# Patient Record
Sex: MALE | Race: OTHER | Employment: FULL TIME | URBAN - METROPOLITAN AREA
[De-identification: names, ages, dates, MRNs, and addresses within clinical notes are randomized per-mention and may not be internally consistent; named-entity substitution may affect disease eponyms.]

---

## 2017-03-28 ENCOUNTER — OFFICE VISIT (OUTPATIENT)
Dept: FAMILY MEDICINE CLINIC | Age: 32
End: 2017-03-28

## 2017-03-28 ENCOUNTER — HOSPITAL ENCOUNTER (OUTPATIENT)
Dept: LAB | Age: 32
Discharge: HOME | End: 2017-03-28
Payer: COMMERCIAL

## 2017-03-28 VITALS
HEIGHT: 70 IN | BODY MASS INDEX: 32.35 KG/M2 | OXYGEN SATURATION: 98 % | TEMPERATURE: 98.3 F | WEIGHT: 226 LBS | SYSTOLIC BLOOD PRESSURE: 159 MMHG | HEART RATE: 78 BPM | DIASTOLIC BLOOD PRESSURE: 95 MMHG

## 2017-03-28 DIAGNOSIS — Z86.14 HISTORY OF MRSA INFECTION: ICD-10-CM

## 2017-03-28 DIAGNOSIS — I51.9 CARDIAC DISEASE: ICD-10-CM

## 2017-03-28 DIAGNOSIS — G89.29 CHRONIC MIDLINE LOW BACK PAIN WITH LEFT-SIDED SCIATICA: ICD-10-CM

## 2017-03-28 DIAGNOSIS — R01.1 HEART MURMUR: ICD-10-CM

## 2017-03-28 DIAGNOSIS — Z76.89 ENCOUNTER TO ESTABLISH CARE: ICD-10-CM

## 2017-03-28 DIAGNOSIS — M54.42 CHRONIC MIDLINE LOW BACK PAIN WITH LEFT-SIDED SCIATICA: ICD-10-CM

## 2017-03-28 DIAGNOSIS — F41.9 ANXIETY: ICD-10-CM

## 2017-03-28 LAB
ALBUMIN SERPL BCP-MCNC: 4.3 G/DL (ref 3.4–5)
ALBUMIN/GLOB SERPL: 1.2 {RATIO} (ref 0.8–1.7)
ALP SERPL-CCNC: 88 U/L (ref 45–117)
ALT SERPL-CCNC: 43 U/L (ref 16–61)
ANION GAP BLD CALC-SCNC: 10 MMOL/L (ref 3–18)
AST SERPL W P-5'-P-CCNC: 31 U/L (ref 15–37)
BASOPHILS # BLD AUTO: 0 K/UL (ref 0–0.06)
BASOPHILS # BLD: 0 % (ref 0–2)
BILIRUB SERPL-MCNC: 0.5 MG/DL (ref 0.2–1)
BUN SERPL-MCNC: 12 MG/DL (ref 7–18)
BUN/CREAT SERPL: 11 (ref 12–20)
CALCIUM SERPL-MCNC: 9.7 MG/DL (ref 8.5–10.1)
CHLORIDE SERPL-SCNC: 103 MMOL/L (ref 100–108)
CHOLEST SERPL-MCNC: 242 MG/DL
CO2 SERPL-SCNC: 27 MMOL/L (ref 21–32)
CREAT SERPL-MCNC: 1.14 MG/DL (ref 0.6–1.3)
DIFFERENTIAL METHOD BLD: ABNORMAL
EOSINOPHIL # BLD: 0.1 K/UL (ref 0–0.4)
EOSINOPHIL NFR BLD: 1 % (ref 0–5)
ERYTHROCYTE [DISTWIDTH] IN BLOOD BY AUTOMATED COUNT: 14.2 % (ref 11.6–14.5)
EST. AVERAGE GLUCOSE BLD GHB EST-MCNC: 105 MG/DL
GLOBULIN SER CALC-MCNC: 3.5 G/DL (ref 2–4)
GLUCOSE SERPL-MCNC: 88 MG/DL (ref 74–99)
HBA1C MFR BLD: 5.3 % (ref 4.2–5.6)
HCT VFR BLD AUTO: 49.2 % (ref 36–48)
HDLC SERPL-MCNC: 44 MG/DL (ref 40–60)
HDLC SERPL: 5.5 {RATIO} (ref 0–5)
HGB BLD-MCNC: 16.2 G/DL (ref 13–16)
LDLC SERPL CALC-MCNC: 149.8 MG/DL (ref 0–100)
LIPID PROFILE,FLP: ABNORMAL
LYMPHOCYTES # BLD AUTO: 23 % (ref 21–52)
LYMPHOCYTES # BLD: 1.3 K/UL (ref 0.9–3.6)
MCH RBC QN AUTO: 30.2 PG (ref 24–34)
MCHC RBC AUTO-ENTMCNC: 32.9 G/DL (ref 31–37)
MCV RBC AUTO: 91.6 FL (ref 74–97)
MONOCYTES # BLD: 0.7 K/UL (ref 0.05–1.2)
MONOCYTES NFR BLD AUTO: 11 % (ref 3–10)
NEUTS SEG # BLD: 3.7 K/UL (ref 1.8–8)
NEUTS SEG NFR BLD AUTO: 65 % (ref 40–73)
PLATELET # BLD AUTO: 209 K/UL (ref 135–420)
PMV BLD AUTO: 10.5 FL (ref 9.2–11.8)
POTASSIUM SERPL-SCNC: 4.4 MMOL/L (ref 3.5–5.5)
PROT SERPL-MCNC: 7.8 G/DL (ref 6.4–8.2)
RBC # BLD AUTO: 5.37 M/UL (ref 4.7–5.5)
SODIUM SERPL-SCNC: 140 MMOL/L (ref 136–145)
TRIGL SERPL-MCNC: 241 MG/DL (ref ?–150)
TSH SERPL DL<=0.05 MIU/L-ACNC: 2.9 UIU/ML (ref 0.36–3.74)
VLDLC SERPL CALC-MCNC: 48.2 MG/DL
WBC # BLD AUTO: 5.8 K/UL (ref 4.6–13.2)

## 2017-03-28 PROCEDURE — 80053 COMPREHEN METABOLIC PANEL: CPT | Performed by: NURSE PRACTITIONER

## 2017-03-28 PROCEDURE — 85025 COMPLETE CBC W/AUTO DIFF WBC: CPT | Performed by: NURSE PRACTITIONER

## 2017-03-28 PROCEDURE — 87389 HIV-1 AG W/HIV-1&-2 AB AG IA: CPT | Performed by: NURSE PRACTITIONER

## 2017-03-28 PROCEDURE — 80061 LIPID PANEL: CPT | Performed by: NURSE PRACTITIONER

## 2017-03-28 PROCEDURE — 36415 COLL VENOUS BLD VENIPUNCTURE: CPT | Performed by: NURSE PRACTITIONER

## 2017-03-28 PROCEDURE — 84443 ASSAY THYROID STIM HORMONE: CPT | Performed by: NURSE PRACTITIONER

## 2017-03-28 PROCEDURE — 83036 HEMOGLOBIN GLYCOSYLATED A1C: CPT | Performed by: NURSE PRACTITIONER

## 2017-03-28 RX ORDER — ESCITALOPRAM OXALATE 10 MG/1
10 TABLET ORAL DAILY
Qty: 30 TAB | Refills: 0 | Status: SHIPPED | OUTPATIENT
Start: 2017-03-28 | End: 2019-12-26 | Stop reason: SDUPTHER

## 2017-03-28 RX ORDER — DOXYCYCLINE 100 MG/1
100 CAPSULE ORAL 2 TIMES DAILY
COMMUNITY
End: 2019-12-26

## 2017-03-28 NOTE — PROGRESS NOTES
HPI  Bailey Haley is a 36 y.o. male  Chief Complaint   Patient presents with   [facility] Other     Pt states that he may have possible MRSA. Pt has HX of MRSA. Pt states that he is having some nose irritation. Pt states that symptoms started last week. Pt denies havinf an Infectious Disease doctor. Pt did speak with [facility] through his insurance. Pt states that e was instructed to take the doxycycline and the bactrim ointment. Pt states the swelling and redness has come subsided since then. Reports he was in the hospital last year for MRSA in his elbow. Reports he also had MRSA on his finger last year but was not hospitalized. Reports he had it in his finger and nose the year before and he was hospitalized for this. Reports hospitalization for MRSA in 2015 and 2016. Diagnosed four different times over the last two years with only two of those times resulting in hospitalization. Reports last night he noticed a pimple in his nose. Reports last night his nose was red and tender on the tip of his nose. Reports he called [facility] and was told to take the doxycyline that he had left over from last year along with a cold compress. Reports this did help. Reports he did take a doxycycline last night but did not take one today as of yet. Denies fevers but reports chills this morning. Reports pimple is gone but he thinks he has MRSA. Reports congestion from allergies. Has 7-8 days of the doxycycline left. He states that he is also using Bactroban to his nares. Reports back pain has returned and his anxiety has increased which he states that both contribute to his blood pressure being elevated. Reports seeing Dr. Edilson Garcia from orthopedics for his back pain but has not been back to see in her six months. Reports radiation of pain down to his left leg with a prior diagnosis of sciatica. Reports increased stress at work and a six month baby at home. Reports he is feeling anxious.  Denies desire to hurt or harm himself or others. Denies suicidal ideations. Reports a cardiac history - Congenital heart disease and open heart surgery in 1987. 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  mupirocin calcium (BACTROBAN) 2 % nasal ointment by Both Nostrils route two (2) times a day.  escitalopram oxalate (LEXAPRO) 10 mg tablet Take 1 Tab by mouth daily.  30 Tab 0    oxyCODONE-acetaminophen (PERCOCET) 5-325 mg per tablet TAKE 1 TABLET BY MOUTH EVERY SIX HOURS AS NEEDED FOR PAIN  0    topiramate (TOPAMAX) 25 mg tablet Take 1 in the evening for 1 week, then increase to 2 the second week and continue with 3 in the evening 90 Tab 1       Allergies  Allergies   Allergen Reactions    Ceclor [Cefaclor] Hives    Clindamycin Hives    Sulfa (Sulfonamide Antibiotics) Hives       Family History  Family History   Problem Relation Age of Onset    Diabetes Maternal Grandmother        Social History  Social History     Social History    Marital status:      Spouse name: N/A    Number of children: N/A    Years of education: N/A     Occupational History          Social History Main Topics    Smoking status: Never Smoker    Smokeless tobacco: Never Used    Alcohol use No    Drug use: No    Sexual activity: Not on file     Other Topics Concern    Not on file     Social History Narrative       Problem List  Patient Active Problem List   Diagnosis Code    Neuritis of left lower extremity G57.92    HNP (herniated nucleus pulposus), lumbar M51.26       Review of Systems  Review of Systems   Constitutional: Positive for chills. Negative for diaphoresis and fever. HENT: Positive for congestion. Negative for nosebleeds and sore throat. Respiratory: Negative for cough and shortness of breath. Cardiovascular: Negative for chest pain and palpitations. Gastrointestinal: Negative for abdominal pain, diarrhea, nausea and vomiting. Musculoskeletal: Positive for back pain. Neurological: Negative for dizziness and tingling. Psychiatric/Behavioral: Positive for depression. Negative for suicidal ideas. The patient is nervous/anxious. Vital Signs  Vitals:    03/28/17 1024   BP: (!) 159/95   Pulse: 78   Temp: 98.3 °F (36.8 °C)   TempSrc: Oral   SpO2: 98%   Weight: 226 lb (102.5 kg)   Height: 5' 10\" (1.778 m)   PainSc:   0 - No pain     PHQ 2 / 9, over the last two weeks 3/28/2017   Little interest or pleasure in doing things Not at all   Feeling down, depressed or hopeless Nearly every day   Total Score PHQ 2 3   Trouble falling or staying asleep, or sleeping too much More than half the days   Feeling tired or having little energy Nearly every day   Poor appetite or overeating Nearly every day   Feeling bad about yourself - or that you are a failure or have let yourself or your family down Several days   Trouble concentrating on things such as school, work, reading or watching TV Nearly every day   Moving or speaking so slowly that other people could have noticed; or the opposite being so fidgety that others notice Several days   Thoughts of being better off dead, or hurting yourself in some way Not at all   PHQ 9 Score 16   How difficult have these problems made it for you to do your work, take care of your home and get along with others Somewhat difficult     MISAEL Score 13 - somewhat difficult    Physical Exam  Physical Exam   Constitutional: He is oriented to person, place, and time.    HENT:   Nose: Mucosal edema, rhinorrhea and sinus tenderness present. Right sinus exhibits no maxillary sinus tenderness and no frontal sinus tenderness. Left sinus exhibits no maxillary sinus tenderness and no frontal sinus tenderness. Left nare red. No pimple, bruising, abrasion, or laceration noted. Tip of nose tender to touch with no redness or bruising noted. Cardiovascular: Normal rate. Murmur heard. Pulmonary/Chest: Effort normal and breath sounds normal.   Abdominal: Soft. Bowel sounds are normal. He exhibits no distension. Musculoskeletal: Normal range of motion. Neurological: He is alert and oriented to person, place, and time. Skin: Skin is warm and dry. Psychiatric: His speech is normal. His mood appears anxious.        Diagnostics  Orders Placed This Encounter    HIV 1/2 AG/AB, 4TH GENERATION,W RFLX CONFIRM     Standing Status:   Future     Number of Occurrences:   1     Standing Expiration Date:   3/29/2018    CBC WITH AUTOMATED DIFF     Standing Status:   Future     Number of Occurrences:   1     Standing Expiration Date:   8/81/4940    METABOLIC PANEL, COMPREHENSIVE     Standing Status:   Future     Number of Occurrences:   1     Standing Expiration Date:   9/25/2017    LIPID PANEL     Standing Status:   Future     Number of Occurrences:   1     Standing Expiration Date:   9/25/2017    TSH 3RD GENERATION     Standing Status:   Future     Number of Occurrences:   1     Standing Expiration Date:   9/25/2017    HEMOGLOBIN A1C WITH EAG     Standing Status:   Future     Number of Occurrences:   1     Standing Expiration Date:   3/29/2018    REFERRAL TO INFECTIOUS DISEASE     Referral Priority:   Routine     Referral Type:   Consultation     Referral Reason:   Specialty Services Required     Referred to Provider:   Trina Rizzo MD     Requested Specialty:   Infectious Diseases    REFERRAL TO CARDIOLOGY     Referral Priority:   Routine     Referral Type:   Consultation     Referral Reason:   Specialty Services Required Referred to Provider:   Rusty Shaikh MD     Requested Specialty:   Cardiology    doxycycline (VIBRAMYCIN) 100 mg capsule     Sig: Take 100 mg by mouth two (2) times a day.  mupirocin calcium (BACTROBAN) 2 % nasal ointment     Sig: by Both Nostrils route two (2) times a day.  escitalopram oxalate (LEXAPRO) 10 mg tablet     Sig: Take 1 Tab by mouth daily. Dispense:  30 Tab     Refill:  0       Results  No results found for this or any previous visit. Assessment and Plan  Josephine Dangelo was seen today for establish care and other. Diagnoses and all orders for this visit:    Elevated blood pressure    History of MRSA infection  -     REFERRAL TO INFECTIOUS DISEASE  -     CBC WITH AUTOMATED DIFF; Future    Chronic midline low back pain with left-sided sciatica    Anxiety  -     escitalopram oxalate (LEXAPRO) 10 mg tablet; Take 1 Tab by mouth daily. Encounter to establish care  -     HIV 1/2 AG/AB, 4TH GENERATION,W RFLX CONFIRM; Future  -     CBC WITH AUTOMATED DIFF; Future  -     METABOLIC PANEL, COMPREHENSIVE; Future  -     LIPID PANEL; Future  -     TSH 3RD GENERATION; Future  -     HEMOGLOBIN A1C WITH EAG; Future    Heart murmur  -     REFERRAL TO CARDIOLOGY    Cardiac disease  -     REFERRAL TO CARDIOLOGY    Other orders  -     Cancel: REFERRAL TO ORTHOPEDICS    Patient to continue doxycycline and Bactroban as prescribed. Educated on side effects of medication and possible allergic reactions discussed. Patient verbalized understanding and states he had no reactions from the medication in the past. Instructed in good hand hygiene. Instructed to keep fingers out of nose and avoid touching face, hands, body and other objects if he does touch his nose. After care summary printed and reviewed with patient. Plan reviewed with patient. Questions answered. Patient verbalized understanding of plan and is in agreement with plan. Patient to follow up in two weeks or earlier if symptoms worsen.  Will re-evaluate medication, discuss lab results, and complete physical exam.    Aris Patel, KONG-C

## 2017-03-28 NOTE — MR AVS SNAPSHOT
Visit Information Date & Time Provider Department Dept. Phone Encounter #  
 3/28/2017 10:00 AM Maria Del Rosario Kaiser NP [address] [id number] Follow-up Instructions Return in about 2 weeks (around 4/11/2017), or if symptoms worsen or fail to improve. Upcoming Health Maintenance Date Due DTaP/Tdap/Td series (1 - Tdap) 11/19/2008 INFLUENZA AGE 9 TO ADULT 8/1/2016 Allergies as of 3/28/2017  Review Complete On: 3/28/2017 By: Maria Del Rosario Kaiser NP Severity Noted Reaction Type Reactions Ceclor [Cefaclor]  10/12/2016    Hives Clindamycin  09/26/2016    Hives Sulfa (Sulfonamide Antibiotics)  09/26/2016    Hives Current Immunizations  Never Reviewed No immunizations on file. Not reviewed this visit You Were Diagnosed With   
  
 Codes Comments Elevated blood pressure    -  Primary ICD-10-CM: I10 
ICD-9-CM: 401.9 History of MRSA infection     ICD-10-CM: Z86.14 
ICD-9-CM: V12.04 Chronic midline low back pain with left-sided sciatica     ICD-10-CM: M54.42, G89.29 ICD-9-CM: 724.2, 724.3, 338.29 Anxiety     ICD-10-CM: F41.9 ICD-9-CM: 300.00 Encounter to establish care     ICD-10-CM: Z76.89 
ICD-9-CM: V65.8 Heart murmur     ICD-10-CM: R01.1 ICD-9-CM: 091. 2 Cardiac disease     ICD-10-CM: I51.9 ICD-9-CM: 429.9 Vitals BP Pulse Temp Height(growth percentile) Weight(growth percentile) SpO2  
 (!) 159/95 (BP 1 Location: Right arm, BP Patient Position: Sitting) 78 98.3 °F (36.8 °C) (Oral) 5' 10\" (1.778 m) 226 lb (102.5 kg) 98% BMI Smoking Status 32.43 kg/m2 Never Smoker BMI and BSA Data Body Mass Index Body Surface Area  
 32.43 kg/m 2 2.25 m 2 Preferred Pharmacy Pharmacy Name Phone [id number] [address] [place] [address] Your Updated Medication List  
  
   
 This list is accurate as of: 3/28/17 11:29 AM.  Always use your most recent med list.  
  
  
  
  
 doxycycline 100 mg capsule Commonly known as:  VIBRAMYCIN Take 100 mg by mouth two (2) times a day. escitalopram oxalate 10 mg tablet Commonly known as:  Skip  Take 1 Tab by mouth daily. mupirocin calcium 2 % nasal ointment Commonly known as:  BACTROBAN  
by Both Nostrils route two (2) times a day. oxyCODONE-acetaminophen 5-325 mg per tablet Commonly known as:  PERCOCET TAKE 1 TABLET BY MOUTH EVERY SIX HOURS AS NEEDED FOR PAIN  
  
 topiramate 25 mg tablet Commonly known as:  TOPAMAX Take 1 in the evening for 1 week, then increase to 2 the second week and continue with 3 in the evening Prescriptions Sent to Pharmacy Refills  
 escitalopram oxalate (LEXAPRO) 10 mg tablet 0 Sig: Take 1 Tab by mouth daily. Class: Normal  
 Pharmacy: [facility]/pharmacy [address] [place] [address], [address] [place] [address] Ph #: [phone] Route: Oral  
  
We Performed the Following REFERRAL TO CARDIOLOGY [HPT23 Custom] Comments:  
 Please evaluate and treat patient for murmur history. Blood pressure recently elevated and patient has concerns with his history of congenital cardiac disease. Recently moved to the area in the last year. REFERRAL TO INFECTIOUS DISEASE [REF37 Custom] Comments:  
 Please evaluate and treat patient for recent history of MRSA infection. Patient was diagnosed and was being treated prior to moving to the area. Follow-up Instructions Return in about 2 weeks (around 4/11/2017), or if symptoms worsen or fail to improve. To-Do List   
 03/28/2017 Lab:  CBC WITH AUTOMATED DIFF   
  
 03/28/2017 Lab:  HEMOGLOBIN A1C WITH EAG   
  
 03/28/2017 Lab:  HIV 1/2 AG/AB, 4TH GENERATION,W RFLX CONFIRM Around 06/26/2017 Lab:  LIPID PANEL Around 06/26/2017   Lab:  METABOLIC PANEL, COMPREHENSIVE   
  
 Around 06/26/2017 Lab:  TSH 3RD GENERATION Referral Information Referral ID Referred By Referred To  
  
 [id number] Aredwinwilliam Chavira, [address] [facility] Phone: [phone] Fax: [phone] Visits Status Start Date End Date 1 New Request 3/28/17 3/28/18 If your referral has a status of pending review or denied, additional information will be sent to support the outcome of this decision. Referral ID Referred By Referred To  
 [id number] ZHANE, [address]  
   [address] [place], [address] Phone: [phone] Fax: [phone] Visits Status Start Date End Date 1 New Request 3/28/17 3/28/18 If your referral has a status of pending review or denied, additional information will be sent to support the outcome of this decision. Patient Instructions Learning About Preventing MRSA Infection What is a MRSA infection? MRSA stands for methicillin-resistant Staphylococcus aureus. It is a type of bacteria that can cause a staph infection. Staph bacteria normally live on your skin and in your nose, usually without causing problems. Sometimes the bacteria cause infection. Usually you can treat this infection with antibiotics. But MRSA infections are harder to treat than other staph infections. This is because antibiotics may not be able to kill MRSA. For some people, especially those who are weak or ill, MRSA infections can become serious. MRSA can spread from person to person. It is commonly spread from the hands of someone who has MRSA. This could be anyone in a health care setting or in the community. MRSA is more likely to develop when antibiotics are used too often or aren't used the right way. Over time, bacteria can change so that these antibiotics no longer work well. How can you prevent MRSA infection? Practice good hygiene · Wash your hands often and thoroughly with soap and clean, running water. You can also use an alcohol-based hand . Hand-washing is the best way to avoid spreading germs. · Keep cuts and scrapes clean and covered with a bandage. Avoid contact with other people's wounds or bandages. · Don't share personal items such as towels or razors. · If you are in the hospital, remind doctors and nurses to wash their hands before they touch you. Use antibiotics wisely · Always ask your doctor if antibiotics are the best treatment. They can help treat bacterial infections, but they can't cure viral infections. Don't pressure your doctor to prescribe antibiotics when they won't help you get better. · If your doctor prescribed antibiotics, take them as directed. Do not stop taking them just because you feel better. You need to take the full course of antibiotics. Using only part of the medicine may cause antibiotic-resistant bacteria to develop. · Do not save any antibiotics. Don't use antibiotics that were prescribed for someone else. What are the symptoms? Symptoms of a MRSA infection depend on where the infection is: 
· If the infection is in a wound, that area of your skin may be red or tender. · If the infection is in the skin, you may have boils or abscesses. It may look like you have been bitten by a spider or insect. How is it diagnosed? The doctor will take a sample of your infected wound or take a blood or urine sample. The sample is tested to see if antibiotics can kill the bacteria. This test may take several days. You may also be tested to see if you are a MRSA carrier. A carrier is a person who has the bacteria on his or her skin but who isn't sick. The doctor will take a swab from the inside of your nose for this test. 
How is MRSA treated? Your doctor may: · Drain your wound. · Give you antibiotics as pills or through a needle put in your vein (IV). · Give you an ointment to put on your skin or inside your nose. · Have you wash your skin daily with an antibiotic soap. You may have to stay in the hospital for treatment. In the hospital, you may be kept apart from others to reduce the chances of spreading the bacteria. Follow-up care is a key part of your treatment and safety. Be sure to make and go to all appointments, and call your doctor if you are having problems. It's also a good idea to know your test results and keep a list of the medicines you take. Where can you learn more? Go to http://shantel-alina.info/. Enter 04.00.14.32.96 in the search box to learn more about \"Learning About Preventing MRSA Infection. \" Current as of: May 24, 2016 Content Version: 11.1 © 2006-2016 [employer]. Care instructions adapted under license by [employer] (which disclaims liability or warranty for this information). If you have questions about a medical condition or this instruction, always ask your healthcare professional. [place] [address] any warranty or liability for your use of this information. Anxiety Disorder: Care Instructions Your Care Instructions Anxiety is a normal reaction to stress. Difficult situations can cause you to have symptoms such as sweaty palms and a nervous feeling. In an anxiety disorder, the symptoms are far more severe. Constant worry, muscle tension, trouble sleeping, nausea and diarrhea, and other symptoms can make normal daily activities difficult or impossible. These symptoms may occur for no reason, and they can affect your work, school, or social life. Medicines, counseling, and self-care can all help. Follow-up care is a key part of your treatment and safety.  Be sure to make and go to all appointments, and call your doctor if you are having problems. It's also a good idea to know your test results and keep a list of the medicines you take. How can you care for yourself at home? · Take medicines exactly as directed. Call your doctor if you think you are having a problem with your medicine. · Go to your counseling sessions and follow-up appointments. · Recognize and accept your anxiety. Then, when you are in a situation that makes you anxious, say to yourself, \"This is not an emergency. I feel uncomfortable, but I am not in danger. I can keep going even if I feel anxious. \" · Be kind to your body: ¨ Relieve tension with exercise or a massage. ¨ Get enough rest. 
¨ Avoid alcohol, caffeine, nicotine, and illegal drugs. They can increase your anxiety level and cause sleep problems. ¨ Learn and do relaxation techniques. See below for more about these techniques. · Engage your mind. Get out and do something you enjoy. Go to a funny movie, or take a walk or hike. Plan your day. Having too much or too little to do can make you anxious. · Keep a record of your symptoms. Discuss your fears with a good friend or family member, or join a support group for people with similar problems. Talking to others sometimes relieves stress. · Get involved in social groups, or volunteer to help others. Being alone sometimes makes things seem worse than they are. · Get at least 30 minutes of exercise on most days of the week to relieve stress. Walking is a good choice. You also may want to do other activities, such as running, swimming, cycling, or playing tennis or team sports. Relaxation techniques Do relaxation exercises 10 to 20 minutes a day. You can play soothing, relaxing music while you do them, if you wish. · Tell others in your house that you are going to do your relaxation exercises. Ask them not to disturb you. · Find a comfortable place, away from all distractions and noise. · Lie down on your back, or sit with your back straight. · Focus on your breathing. Make it slow and steady. · Breathe in through your nose. Breathe out through either your nose or mouth. · Breathe deeply, filling up the area between your navel and your rib cage. Breathe so that your belly goes up and down. · Do not hold your breath. · Breathe like this for 5 to 10 minutes. Notice the feeling of calmness throughout your whole body. As you continue to breathe slowly and deeply, relax by doing the following for another 5 to 10 minutes: · Tighten and relax each muscle group in your body. You can begin at your toes and work your way up to your head. · Imagine your muscle groups relaxing and becoming heavy. · Empty your mind of all thoughts. · Let yourself relax more and more deeply. · Become aware of the state of calmness that surrounds you. · When your relaxation time is over, you can bring yourself back to alertness by moving your fingers and toes and then your hands and feet and then stretching and moving your entire body. Sometimes people fall asleep during relaxation, but they usually wake up shortly afterward. · Always give yourself time to return to full alertness before you drive a car or do anything that might cause an accident if you are not fully alert. Never play a relaxation tape while you drive a car. When should you call for help? Call 911 anytime you think you may need emergency care. For example, call if: 
· You feel you cannot stop from hurting yourself or someone else. Keep the numbers for these national suicide hotlines: 0-670-806-TALK (0-647.252.2391) and 5-723-DPVJOTL (2-626.611.5885). If you or someone you know talks about suicide or feeling hopeless, get help right away. Watch closely for changes in your health, and be sure to contact your doctor if: 
· You have anxiety or fear that affects your life. · You have symptoms of anxiety that are new or different from those you had before. Where can you learn more? Go to http://shantel-alina.info/. Enter P754 in the search box to learn more about \"Anxiety Disorder: Care Instructions. \" Current as of: July 26, 2016 Content Version: 11.1 © 6181-7594 [employer]. Care instructions adapted under license by [employer] (which disclaims liability or warranty for this information). If you have questions about a medical condition or this instruction, always ask your healthcare professional. [address] any warranty or liability for your use of this information. Introducing [facility]! [facility] introduces [employer] patient portal. Now you can access parts of your medical record, email your doctor's office, and request medication refills online. 1. In your internet browser, go to https://[employer]. [employer] 2. Click on the First Time User? Click Here link in the Sign In box. You will see the New Member Sign Up page. 3. Enter your [employer] Access Code exactly as it appears below. You will not need to use this code after youve completed the sign-up process. If you do not sign up before the expiration date, you must request a new code. · [employer] Access Code: [email or web address] Expires: 6/26/2017 10:13 AM 
 
4. Enter the last four digits of your Social Security Number (xxxx) and Date of Birth (mm/dd/yyyy) as indicated and click Submit. You will be taken to the next sign-up page. 5. Create a [employer] ID. This will be your [employer] login ID and cannot be changed, so think of one that is secure and easy to remember. 6. Create a [employer] password. You can change your password at any time. 7. Enter your Password Reset Question and Answer. This can be used at a later time if you forget your password. 8. Enter your e-mail address. You will receive e-mail notification when new information is available in [address] 9. Click Sign Up. You can now view and download portions of your medical record. 10. Click the Download Summary menu link to download a portable copy of your medical information. If you have questions, please visit the Frequently Asked Questions section of the [employer] website. Remember, [employer] is NOT to be used for urgent needs. For medical emergencies, dial 911. Now available from your iPhone and Android! Please provide this summary of care documentation to your next provider. Your primary care clinician is listed as Bk Alvarez. If you have any questions after today's visit, please call [phone].

## 2017-03-28 NOTE — PROGRESS NOTES
1. Have you been to the ER, urgent care clinic since your last visit? Hospitalized since your last visit? No    2. Have you seen or consulted any other health care providers outside of the [address] [place] [address] since your last visit? Include any pap smears or colon screening. No    Is someone accompanying this pt? no    Is the patient using any DME equipment during OV? no      Chief Complaint   Patient presents with   [address] Other     Pt states that he may have possible MRSA. Pt has HX of MRSA. Pt states that he is having some nose irritation. Pt states that symptoms started last week. Pt denies havinf an Infectious Disease doctor. Pt did speak with MD live through his insurance. Pt states that e was instructed to take the doxycycline and the bactrim ointment. Pt states the swelling and redness has come subsided since then.

## 2017-03-28 NOTE — PATIENT INSTRUCTIONS
Learning About Preventing MRSA Infection  What is a MRSA infection? MRSA stands for methicillin-resistant Staphylococcus aureus. It is a type of bacteria that can cause a staph infection. Staph bacteria normally live on your skin and in your nose, usually without causing problems. Sometimes the bacteria cause infection. Usually you can treat this infection with antibiotics. But MRSA infections are harder to treat than other staph infections. This is because antibiotics may not be able to kill MRSA. For some people, especially those who are weak or ill, MRSA infections can become serious. MRSA can spread from person to person. It is commonly spread from the hands of someone who has MRSA. This could be anyone in a health care setting or in the community. MRSA is more likely to develop when antibiotics are used too often or aren't used the right way. Over time, bacteria can change so that these antibiotics no longer work well. How can you prevent MRSA infection? Practice good hygiene  · Wash your hands often and thoroughly with soap and clean, running water. You can also use an alcohol-based hand . Hand-washing is the best way to avoid spreading germs. · Keep cuts and scrapes clean and covered with a bandage. Avoid contact with other people's wounds or bandages. · Don't share personal items such as towels or razors. · If you are in the hospital, remind doctors and nurses to wash their hands before they touch you. Use antibiotics wisely  · Always ask your doctor if antibiotics are the best treatment. They can help treat bacterial infections, but they can't cure viral infections. Don't pressure your doctor to prescribe antibiotics when they won't help you get better. · If your doctor prescribed antibiotics, take them as directed. Do not stop taking them just because you feel better. You need to take the full course of antibiotics.  Using only part of the medicine may cause antibiotic-resistant bacteria to develop. · Do not save any antibiotics. Don't use antibiotics that were prescribed for someone else. What are the symptoms? Symptoms of a MRSA infection depend on where the infection is:  · If the infection is in a wound, that area of your skin may be red or tender. · If the infection is in the skin, you may have boils or abscesses. It may look like you have been bitten by a spider or insect. How is it diagnosed? The doctor will take a sample of your infected wound or take a blood or urine sample. The sample is tested to see if antibiotics can kill the bacteria. This test may take several days. You may also be tested to see if you are a MRSA carrier. A carrier is a person who has the bacteria on his or her skin but who isn't sick. The doctor will take a swab from the inside of your nose for this test.  How is MRSA treated? Your doctor may:  · Drain your wound. · Give you antibiotics as pills or through a needle put in your vein (IV). · Give you an ointment to put on your skin or inside your nose. · Have you wash your skin daily with an antibiotic soap. You may have to stay in the hospital for treatment. In the hospital, you may be kept apart from others to reduce the chances of spreading the bacteria. Follow-up care is a key part of your treatment and safety. Be sure to make and go to all appointments, and call your doctor if you are having problems. It's also a good idea to know your test results and keep a list of the medicines you take. Where can you learn more? Go to http://shantel-alina.info/. Enter 04.00.14.32.96 in the search box to learn more about \"Learning About Preventing MRSA Infection. \"  Current as of: May 24, 2016  Content Version: 11.1  © 0446-6330 [employer]. Care instructions adapted under license by [employer] (which disclaims liability or warranty for this information).  If you have questions about a medical condition or this instruction, always ask your healthcare professional. [address] any warranty or liability for your use of this information. Anxiety Disorder: Care Instructions  Your Care Instructions  Anxiety is a normal reaction to stress. Difficult situations can cause you to have symptoms such as sweaty palms and a nervous feeling. In an anxiety disorder, the symptoms are far more severe. Constant worry, muscle tension, trouble sleeping, nausea and diarrhea, and other symptoms can make normal daily activities difficult or impossible. These symptoms may occur for no reason, and they can affect your work, school, or social life. Medicines, counseling, and self-care can all help. Follow-up care is a key part of your treatment and safety. Be sure to make and go to all appointments, and call your doctor if you are having problems. It's also a good idea to know your test results and keep a list of the medicines you take. How can you care for yourself at home? · Take medicines exactly as directed. Call your doctor if you think you are having a problem with your medicine. · Go to your counseling sessions and follow-up appointments. · Recognize and accept your anxiety. Then, when you are in a situation that makes you anxious, say to yourself, \"This is not an emergency. I feel uncomfortable, but I am not in danger. I can keep going even if I feel anxious. \"  · Be kind to your body:  ¨ Relieve tension with exercise or a massage. ¨ Get enough rest.  ¨ Avoid alcohol, caffeine, nicotine, and illegal drugs. They can increase your anxiety level and cause sleep problems. ¨ Learn and do relaxation techniques. See below for more about these techniques. · Engage your mind. Get out and do something you enjoy. Go to a funny movie, or take a walk or hike. Plan your day. Having too much or too little to do can make you anxious. · Keep a record of your symptoms.  Discuss your fears with a good friend or family member, or join a support group for people with similar problems. Talking to others sometimes relieves stress. · Get involved in social groups, or volunteer to help others. Being alone sometimes makes things seem worse than they are. · Get at least 30 minutes of exercise on most days of the week to relieve stress. Walking is a good choice. You also may want to do other activities, such as running, swimming, cycling, or playing tennis or team sports. Relaxation techniques  Do relaxation exercises 10 to 20 minutes a day. You can play soothing, relaxing music while you do them, if you wish. · Tell others in your house that you are going to do your relaxation exercises. Ask them not to disturb you. · Find a comfortable place, away from all distractions and noise. · Lie down on your back, or sit with your back straight. · Focus on your breathing. Make it slow and steady. · Breathe in through your nose. Breathe out through either your nose or mouth. · Breathe deeply, filling up the area between your navel and your rib cage. Breathe so that your belly goes up and down. · Do not hold your breath. · Breathe like this for 5 to 10 minutes. Notice the feeling of calmness throughout your whole body. As you continue to breathe slowly and deeply, relax by doing the following for another 5 to 10 minutes:  · Tighten and relax each muscle group in your body. You can begin at your toes and work your way up to your head. · Imagine your muscle groups relaxing and becoming heavy. · Empty your mind of all thoughts. · Let yourself relax more and more deeply. · Become aware of the state of calmness that surrounds you. · When your relaxation time is over, you can bring yourself back to alertness by moving your fingers and toes and then your hands and feet and then stretching and moving your entire body. Sometimes people fall asleep during relaxation, but they usually wake up shortly afterward.   · Always give yourself time to return to full alertness before you drive a car or do anything that might cause an accident if you are not fully alert. Never play a relaxation tape while you drive a car. When should you call for help? Call 911 anytime you think you may need emergency care. For example, call if:  · You feel you cannot stop from hurting yourself or someone else. Keep the numbers for these national suicide hotlines: 7-310-912-TALK (1-728.160.7254) and 6-883-ZZHNFCW (8-881.185.5409). If you or someone you know talks about suicide or feeling hopeless, get help right away. Watch closely for changes in your health, and be sure to contact your doctor if:  · You have anxiety or fear that affects your life. · You have symptoms of anxiety that are new or different from those you had before. Where can you learn more? Go to http://shantel-alina.info/. Enter P754 in the search box to learn more about \"Anxiety Disorder: Care Instructions. \"  Current as of: July 26, 2016  Content Version: 11.1  © 4871-2073 [employer]. Care instructions adapted under license by [employer] (which disclaims liability or warranty for this information). If you have questions about a medical condition or this instruction, always ask your healthcare professional. [address] any warranty or liability for your use of this information.

## 2017-03-29 LAB — HIV 1+2 AB+HIV1 P24 AG SERPL QL IA: NON REACTIVE

## 2017-04-03 ENCOUNTER — TELEPHONE (OUTPATIENT)
Dept: FAMILY MEDICINE CLINIC | Age: 32
End: 2017-04-03

## 2017-04-03 NOTE — TELEPHONE ENCOUNTER
[facility] called,said pt has been seeing a dr Meeta Llanes is not [facility],the dr Yosvany Price he go back to see dr Talha Goetz

## 2017-04-04 NOTE — TELEPHONE ENCOUNTER
Contacted Dr. Madeline Felix office regarding previous note. PSR form Dr. Madeline Felix office stated that this Pt has never been seen at their office. LVM informing David Lynch with [facility] Infectious [facility] office that of this information. Contact information provided.

## 2017-11-09 ENCOUNTER — OFFICE VISIT (OUTPATIENT)
Dept: FAMILY MEDICINE CLINIC | Age: 32
End: 2017-11-09

## 2017-11-09 ENCOUNTER — HOSPITAL ENCOUNTER (OUTPATIENT)
Dept: LAB | Age: 32
Discharge: HOME | End: 2017-11-09
Payer: COMMERCIAL

## 2017-11-09 VITALS
BODY MASS INDEX: 33.5 KG/M2 | TEMPERATURE: 97.8 F | DIASTOLIC BLOOD PRESSURE: 89 MMHG | WEIGHT: 234 LBS | HEIGHT: 70 IN | SYSTOLIC BLOOD PRESSURE: 156 MMHG | OXYGEN SATURATION: 97 % | HEART RATE: 84 BPM | RESPIRATION RATE: 17 BRPM

## 2017-11-09 DIAGNOSIS — I10 ESSENTIAL HYPERTENSION: ICD-10-CM

## 2017-11-09 DIAGNOSIS — R22.32 AXILLARY LUMP, LEFT: Primary | ICD-10-CM

## 2017-11-09 DIAGNOSIS — R22.32 AXILLARY LUMP, LEFT: ICD-10-CM

## 2017-11-09 LAB
ALBUMIN SERPL-MCNC: 4.4 G/DL (ref 3.4–5)
ALBUMIN/GLOB SERPL: 1.2 {RATIO} (ref 0.8–1.7)
ALP SERPL-CCNC: 93 U/L (ref 45–117)
ALT SERPL-CCNC: 60 U/L (ref 16–61)
ANION GAP SERPL CALC-SCNC: 11 MMOL/L (ref 3–18)
AST SERPL-CCNC: 25 U/L (ref 15–37)
BASOPHILS # BLD: 0 K/UL (ref 0–0.06)
BASOPHILS NFR BLD: 0 % (ref 0–2)
BILIRUB SERPL-MCNC: 0.3 MG/DL (ref 0.2–1)
BUN SERPL-MCNC: 13 MG/DL (ref 7–18)
BUN/CREAT SERPL: 12 (ref 12–20)
CALCIUM SERPL-MCNC: 10.1 MG/DL (ref 8.5–10.1)
CHLORIDE SERPL-SCNC: 103 MMOL/L (ref 100–108)
CO2 SERPL-SCNC: 27 MMOL/L (ref 21–32)
CREAT SERPL-MCNC: 1.09 MG/DL (ref 0.6–1.3)
DIFFERENTIAL METHOD BLD: ABNORMAL
EOSINOPHIL # BLD: 0.1 K/UL (ref 0–0.4)
EOSINOPHIL NFR BLD: 2 % (ref 0–5)
ERYTHROCYTE [DISTWIDTH] IN BLOOD BY AUTOMATED COUNT: 13.4 % (ref 11.6–14.5)
GLOBULIN SER CALC-MCNC: 3.6 G/DL (ref 2–4)
GLUCOSE SERPL-MCNC: 83 MG/DL (ref 74–99)
HCT VFR BLD AUTO: 49.4 % (ref 36–48)
HGB BLD-MCNC: 16.4 G/DL (ref 13–16)
LYMPHOCYTES # BLD: 1.7 K/UL (ref 0.9–3.6)
LYMPHOCYTES NFR BLD: 30 % (ref 21–52)
MCH RBC QN AUTO: 30.1 PG (ref 24–34)
MCHC RBC AUTO-ENTMCNC: 33.2 G/DL (ref 31–37)
MCV RBC AUTO: 90.8 FL (ref 74–97)
MONOCYTES # BLD: 0.7 K/UL (ref 0.05–1.2)
MONOCYTES NFR BLD: 11 % (ref 3–10)
NEUTS SEG # BLD: 3.3 K/UL (ref 1.8–8)
NEUTS SEG NFR BLD: 57 % (ref 40–73)
PLATELET # BLD AUTO: 190 K/UL (ref 135–420)
PMV BLD AUTO: 11.3 FL (ref 9.2–11.8)
POTASSIUM SERPL-SCNC: 3.9 MMOL/L (ref 3.5–5.5)
PROT SERPL-MCNC: 8 G/DL (ref 6.4–8.2)
RBC # BLD AUTO: 5.44 M/UL (ref 4.7–5.5)
SODIUM SERPL-SCNC: 141 MMOL/L (ref 136–145)
WBC # BLD AUTO: 5.8 K/UL (ref 4.6–13.2)

## 2017-11-09 PROCEDURE — 85025 COMPLETE CBC W/AUTO DIFF WBC: CPT | Performed by: NURSE PRACTITIONER

## 2017-11-09 PROCEDURE — 80053 COMPREHEN METABOLIC PANEL: CPT | Performed by: NURSE PRACTITIONER

## 2017-11-09 PROCEDURE — 36415 COLL VENOUS BLD VENIPUNCTURE: CPT | Performed by: NURSE PRACTITIONER

## 2017-11-09 NOTE — PROGRESS NOTES
HPI  Margie Mehta is a 36 y.o. male  Chief Complaint   Patient presents with    Cyst     states that he has a cyst under his Left axilla, pain with palpation only, states that he has had this for 3 months      Blood pressure - Reports spine surgery in the next three months. Reports stress and pain causes his blood pressure to go up. Admits that he did not attend his cardiology referral in the past. States that he has been very busy with work. Denies smoking or alcohol intake. Reports left arm cyst for about  2-3 months. Denies pain currently but admits area does become irritated and tender on palpation at times. Denies using warm compresses or anything to area. Reports he is now concerned about the area as it is increasing in size and he is having more episodes of tenderness and irritation. 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  mupirocin calcium (BACTROBAN) 2 % nasal ointment by Both Nostrils route two (2) times a day.  escitalopram oxalate (LEXAPRO) 10 mg tablet Take 1 Tab by mouth daily.  30 Tab 0    topiramate (TOPAMAX) 25 mg tablet Take 1 in the evening for 1 week, then increase to 2 the second week and continue with 3 in the evening 90 Tab 1       Allergies  Allergies   Allergen Reactions    Ceclor [Cefaclor] Hives    Clindamycin Hives    Sulfa (Sulfonamide Antibiotics) Hives       Family History  Family History   Problem Relation Age of Onset    Diabetes Maternal Grandmother        Social History  Social History     Social History    Marital status:      Spouse name: N/A    Number of children: N/A    Years of education: N/A     Occupational History          Social History Main Topics    Smoking status: Never Smoker    Smokeless tobacco: Never Used    Alcohol use No    Drug use: No    Sexual activity: Not on file     Other Topics Concern    Not on file     Social History Narrative       Problem List  Patient Active Problem List   Diagnosis Code    Neuritis of left lower extremity G57.92    HNP (herniated nucleus pulposus), lumbar M51.26       Review of Systems  Review of Systems   Constitutional: Negative for chills and fever. Eyes: Negative for blurred vision. Respiratory: Negative for shortness of breath. Cardiovascular: Negative for chest pain. Musculoskeletal: Positive for back pain (Chronic surgery pending. ). Skin: Negative for itching. Neurological: Negative for dizziness. Vital Signs  Vitals:    11/09/17 0909   BP: 156/89   Pulse: 84   Resp: 17   Temp: 97.8 °F (36.6 °C)   TempSrc: Oral   SpO2: 97%   Weight: 234 lb (106.1 kg)   Height: 5' 10\" (1.778 m)   PainSc:   0 - No pain       Physical Exam  Physical Exam   Constitutional: He is oriented to person, place, and time. HENT:   Mouth/Throat: Oropharynx is clear and moist.   Cardiovascular: Normal rate and regular rhythm. Murmur heard. Pulmonary/Chest: Effort normal and breath sounds normal. No respiratory distress. He has no wheezes. Neurological: He is alert and oriented to person, place, and time. Skin:   Left axillary fluctuant nodule, non-tender. No drainage. No redness, no erythema to left axillary site. Psychiatric: He has a normal mood and affect.        Diagnostics  Orders Placed This Encounter    CBC WITH AUTOMATED DIFF     Standing Status:   Future     Standing Expiration Date:   16/27/8215    METABOLIC PANEL, COMPREHENSIVE     Standing Status:   Future     Standing Expiration Date:   5/9/2018    REFERRAL TO GENERAL SURGERY     Referral Priority:   Routine     Referral Type:   Consultation     Referral Reason:   Specialty Services Required     Referred to Provider:   Caitlin Hubbard MD     Requested Specialty:   General Surgery       Results  Results for orders placed or performed during the hospital encounter of 03/28/17   HIV 1/2 AG/AB, 4TH GENERATION,W RFLX CONFIRM   Result Value Ref Range    HIV Screen, 4th gen Non Reactive Non Reactive   CBC WITH AUTOMATED DIFF   Result Value Ref Range    WBC 5.8 4.6 - 13.2 K/uL    RBC 5.37 4.70 - 5.50 M/uL    HGB 16.2 (H) 13.0 - 16.0 g/dL    HCT 49.2 (H) 36.0 - 48.0 %    MCV 91.6 74.0 - 97.0 FL    MCH 30.2 24.0 - 34.0 PG    MCHC 32.9 31.0 - 37.0 g/dL    RDW 14.2 11.6 - 14.5 %    PLATELET 686 691 - 406 K/uL    MPV 10.5 9.2 - 11.8 FL    NEUTROPHILS 65 40 - 73 %    LYMPHOCYTES 23 21 - 52 %    MONOCYTES 11 (H) 3 - 10 %    EOSINOPHILS 1 0 - 5 %    BASOPHILS 0 0 - 2 %    ABS. NEUTROPHILS 3.7 1.8 - 8.0 K/UL    ABS. LYMPHOCYTES 1.3 0.9 - 3.6 K/UL    ABS. MONOCYTES 0.7 0.05 - 1.2 K/UL    ABS. EOSINOPHILS 0.1 0.0 - 0.4 K/UL    ABS. BASOPHILS 0.0 0.0 - 0.06 K/UL    DF AUTOMATED     METABOLIC PANEL, COMPREHENSIVE   Result Value Ref Range    Sodium 140 136 - 145 mmol/L    Potassium 4.4 3.5 - 5.5 mmol/L    Chloride 103 100 - 108 mmol/L    CO2 27 21 - 32 mmol/L    Anion gap 10 3.0 - 18 mmol/L    Glucose 88 74 - 99 mg/dL    BUN 12 7.0 - 18 MG/DL    Creatinine 1.14 0.6 - 1.3 MG/DL    BUN/Creatinine ratio 11 (L) 12 - 20      GFR est AA >60 >60 ml/min/1.73m2    GFR est non-AA >60 >60 ml/min/1.73m2    Calcium 9.7 8.5 - 10.1 MG/DL    Bilirubin, total 0.5 0.2 - 1.0 MG/DL    ALT (SGPT) 43 16 - 61 U/L    AST (SGOT) 31 15 - 37 U/L    Alk.  phosphatase 88 45 - 117 U/L    Protein, total 7.8 6.4 - 8.2 g/dL    Albumin 4.3 3.4 - 5.0 g/dL    Globulin 3.5 2.0 - 4.0 g/dL    A-G Ratio 1.2 0.8 - 1.7     LIPID PANEL   Result Value Ref Range    LIPID PROFILE          Cholesterol, total 242 (H) <200 MG/DL    Triglyceride 241 (H) <150 MG/DL    HDL Cholesterol 44 40 - 60 MG/DL    LDL, calculated 149.8 (H) 0 - 100 MG/DL    VLDL, calculated 48.2 MG/DL    CHOL/HDL Ratio 5.5 (H) 0 - 5.0     TSH 3RD GENERATION   Result Value Ref Range    TSH 2.90 0.36 - 3.74 uIU/mL   HEMOGLOBIN A1C WITH EAG   Result Value Ref Range    Hemoglobin A1c 5.3 4.2 - 5.6 %    Est. average glucose 105 mg/dL         Assessment and Plan  Diagnoses and all orders for this visit:    1. Axillary lump, left  -     REFERRAL TO GENERAL SURGERY  -     CBC WITH AUTOMATED DIFF; Future    2. Essential hypertension  -     CBC WITH AUTOMATED DIFF; Future  -     METABOLIC PANEL, COMPREHENSIVE; Future    3. BMI 33.0-33.9,adult      Discussed hypertension in detail. Discussed starting patient on medication. Patient has declined all medication at this time as he is concerned about medication side effects. Discussed signs and symptoms of stroke and MI. Patient is aware that he is at risk and accepts consequences. Declines cardiology at this visit - will revisit at next visit with patient. Patient aware that BMI and weight can contribute to hypertension. Discussed diet. Patient agrees to reduce fast food intake in hopes to reduce his BMI as he is eating out daily. DASH diet given to patient. After care summary printed and reviewed with patient. Plan reviewed with patient. Questions answered. Patient verbalized understanding of plan and is in agreement with plan. Patient to follow up in one week for blood pressure or earlier if symptoms worsen. Possible referral to cardiology again if patient is in agreement with it at next visit.      James Christianson, KONG-C

## 2017-11-09 NOTE — MR AVS SNAPSHOT
Visit Information Date & Time Provider Department Dept. Phone Encounter #  
 11/9/2017  9:00 AM Oneal [facility]ox[facility]ORACE [address] [id number] Follow-up Instructions Return in about 1 week (around 11/16/2017), or if symptoms worsen or fail to improve. Upcoming Health Maintenance Date Due DTaP/Tdap/Td series (1 - Tdap) 11/19/2008 Allergies as of 11/9/2017  Review Complete On: 46/0/2331 By: Carrol Jose. Yair Garcia LPN Severity Noted Reaction Type Reactions Ceclor [Cefaclor]  10/12/2016    Hives Clindamycin  09/26/2016    Hives Sulfa (Sulfonamide Antibiotics)  09/26/2016    Hives Current Immunizations  Never Reviewed No immunizations on file. Not reviewed this visit You Were Diagnosed With   
  
 Codes Comments Axillary lump, left    -  Primary ICD-10-CM: R22.32 
ICD-9-CM: 782.2 Essential hypertension     ICD-10-CM: I10 
ICD-9-CM: 401.9 BMI 33.0-33.9,adult     ICD-10-CM: I65.31 
ICD-9-CM: V85.33 Vitals BP Pulse Temp Resp Height(growth percentile) Weight(growth percentile) 156/89 (BP 1 Location: Right arm, BP Patient Position: Sitting) 84 97.8 °F (36.6 °C) (Oral) 17 5' 10\" (1.778 m) 234 lb (106.1 kg) SpO2 BMI Smoking Status 97% 33.58 kg/m2 Never Smoker BMI and BSA Data Body Mass Index Body Surface Area 33.58 kg/m 2 2.29 m 2 Preferred Pharmacy Pharmacy Name Phone [id number] [address] Your Updated Medication List  
  
   
This list is accurate as of: 11/9/17 10:00 AM.  Always use your most recent med list.  
  
  
  
  
 doxycycline 100 mg capsule Commonly known as:  VIBRAMYCIN Take 100 mg by mouth two (2) times a day. escitalopram oxalate 10 mg tablet Commonly known as:  Jeffrey Cardenasll Take 1 Tab by mouth daily. mupirocin calcium 2 % nasal ointment Commonly known as:  [facility]  
 by Both Nostrils route two (2) times a day. oxyCODONE-acetaminophen 5-325 mg per tablet Commonly known as:  PERCOCET TAKE 1 TABLET BY MOUTH EVERY SIX HOURS AS NEEDED FOR PAIN  
  
 topiramate 25 mg tablet Commonly known as:  TOPAMAX Take 1 in the evening for 1 week, then increase to 2 the second week and continue with 3 in the evening We Performed the Following REFERRAL TO GENERAL SURGERY [REF27 Custom] Comments:  
 Please evaluate and treat patient for left axillary cyst.  
  
Follow-up Instructions Return in about 1 week (around 11/16/2017), or if symptoms worsen or fail to improve. To-Do List   
 11/09/2017 Lab:  CBC WITH AUTOMATED DIFF Around 02/07/2018 Lab:  METABOLIC PANEL, COMPREHENSIVE Referral Information Referral ID Referred By Referred To  
  
 [id number] Jose Elias Cooney MD   
   [address] [place] [address] Phone: [phone] Fax: [phone] Visits Status Start Date End Date 1 New Request 11/9/17 11/9/18 If your referral has a status of pending review or denied, additional information will be sent to support the outcome of this decision. Patient Instructions Please contact our office if you have any questions about your visit today. High Blood Pressure: Care Instructions Your Care Instructions If your blood pressure is usually above 140/90, you have high blood pressure, or hypertension. That means the top number is 140 or higher or the bottom number is 90 or higher, or both. Despite what a lot of people think, high blood pressure usually doesn't cause headaches or make you feel dizzy or lightheaded. It usually has no symptoms. But it does increase your risk for heart attack, stroke, and kidney or eye damage. The higher your blood pressure, the more your risk increases. Your doctor will give you a goal for your blood pressure. Your goal will be based on your health and your age. An example of a goal is to keep your blood pressure below 140/90. Lifestyle changes, such as eating healthy and being active, are always important to help lower blood pressure. You might also take medicine to reach your blood pressure goal. 
Follow-up care is a key part of your treatment and safety. Be sure to make and go to all appointments, and call your doctor if you are having problems. It's also a good idea to know your test results and keep a list of the medicines you take. How can you care for yourself at home? Medical treatment · If you stop taking your medicine, your blood pressure will go back up. You may take one or more types of medicine to lower your blood pressure. Be safe with medicines. Take your medicine exactly as prescribed. Call your doctor if you think you are having a problem with your medicine. · Talk to your doctor before you start taking aspirin every day. Aspirin can help certain people lower their risk of a heart attack or stroke. But taking aspirin isn't right for everyone, because it can cause serious bleeding. · See your doctor regularly. You may need to see the doctor more often at first or until your blood pressure comes down. · If you are taking blood pressure medicine, talk to your doctor before you take decongestants or anti-inflammatory medicine, such as ibuprofen. Some of these medicines can raise blood pressure. · Learn how to check your blood pressure at home. Lifestyle changes · Stay at a healthy weight. This is especially important if you put on weight around the waist. Losing even 10 pounds can help you lower your blood pressure. · If your doctor recommends it, get more exercise. Walking is a good choice. Bit by bit, increase the amount you walk every day. Try for at least 30 minutes on most days of the week.  You also may want to swim, bike, or do other activities. · Avoid or limit alcohol. Talk to your doctor about whether you can drink any alcohol. · Try to limit how much sodium you eat to less than 2,300 milligrams (mg) a day. Your doctor may ask you to try to eat less than 1,500 mg a day. · Eat plenty of fruits (such as bananas and oranges), vegetables, legumes, whole grains, and low-fat dairy products. · Lower the amount of saturated fat in your diet. Saturated fat is found in animal products such as milk, cheese, and meat. Limiting these foods may help you lose weight and also lower your risk for heart disease. · Do not smoke. Smoking increases your risk for heart attack and stroke. If you need help quitting, talk to your doctor about stop-smoking programs and medicines. These can increase your chances of quitting for good. When should you call for help? Call 911 anytime you think you may need emergency care. This may mean having symptoms that suggest that your blood pressure is causing a serious heart or blood vessel problem. Your blood pressure may be over 180/110. ? For example, call 911 if: 
? · You have symptoms of a heart attack. These may include: ¨ Chest pain or pressure, or a strange feeling in the chest. 
¨ Sweating. ¨ Shortness of breath. ¨ Nausea or vomiting. ¨ Pain, pressure, or a strange feeling in the back, neck, jaw, or upper belly or in one or both shoulders or arms. ¨ Lightheadedness or sudden weakness. ¨ A fast or irregular heartbeat. ? · You have symptoms of a stroke. These may include: 
¨ Sudden numbness, tingling, weakness, or loss of movement in your face, arm, or leg, especially on only one side of your body. ¨ Sudden vision changes. ¨ Sudden trouble speaking. ¨ Sudden confusion or trouble understanding simple statements. ¨ Sudden problems with walking or balance. ¨ A sudden, severe headache that is different from past headaches. ? · You have severe back or belly pain. ?Do not wait until your blood pressure comes down on its own. Get help right away. ?Call your doctor now or seek immediate care if: 
? · Your blood pressure is much higher than normal (such as 180/110 or higher), but you don't have symptoms. ? · You think high blood pressure is causing symptoms, such as: ¨ Severe headache. ¨ Blurry vision. ? Watch closely for changes in your health, and be sure to contact your doctor if: 
? · Your blood pressure measures 140/90 or higher at least 2 times. That means the top number is 140 or higher or the bottom number is 90 or higher, or both. ? · You think you may be having side effects from your blood pressure medicine. ? · Your blood pressure is usually normal, but it goes above normal at least 2 times. Where can you learn more? Go to http://shantel-alina.info/. Enter Y001 in the search box to learn more about \"High Blood Pressure: Care Instructions. \" Current as of: September 21, 2016 Content Version: 11.4 © 8248-8469 [employer]. Care instructions adapted under license by [employer] (which disclaims liability or warranty for this information). If you have questions about a medical condition or this instruction, always ask your healthcare professional. [address] any warranty or liability for your use of this information. Epidermoid Cyst: Care Instructions Your Care Instructions An epidermoid (say \"rz-alx-EMH-manohar\") cyst is a lump just under the skin. These cysts can form when a hair follicle becomes blocked. They are common in acne and may occur on the face, neck, back, and genitals. However, they can form anywhere on the body. These cysts are not cancer and do not lead to cancer. They tend not to hurt, but they can sometimes become swollen and painful. They also may break open (rupture) and cause scarring. These cysts sometimes do not cause problems and may not need treatment.  If you have a cyst that is swollen and hurts, your doctor may inject it with a medicine to help it heal. But it is more likely that a painful cyst will need to be removed. Your doctor will give you a shot of numbing medicine and cut into the cyst to drain it or remove it. This makes the symptoms go away. Follow-up care is a key part of your treatment and safety. Be sure to make and go to all appointments, and call your doctor if you are having problems. It's also a good idea to know your test results and keep a list of the medicines you take. How can you care for yourself at home? · Do not squeeze the cyst or poke it with a needle to open it. This can cause swelling, redness, and infection. · Always have a doctor look at any new lumps you get to make sure that they are not serious. When should you call for help? Watch closely for changes in your health, and be sure to contact your doctor if: 
? · You have a fever, redness, or swelling after you get a shot of medicine in the cyst.  
? · You see or feel a new lump on your skin. Where can you learn more? Go to http://shantel-alina.info/. Enter J417 in the search box to learn more about \"Epidermoid Cyst: Care Instructions. \" Current as of: October 13, 2016 Content Version: 11.4 © 8331-1552 [employer]. Care instructions adapted under license by [employer] (which disclaims liability or warranty for this information). If you have questions about a medical condition or this instruction, always ask your healthcare professional. [place] [address] any warranty or liability for your use of this information. DASH Diet: Care Instructions Your Care Instructions The DASH diet is an eating plan that can help lower your blood pressure. DASH stands for Dietary Approaches to Stop Hypertension. Hypertension is high blood pressure.  
The DASH diet focuses on eating foods that are high in calcium, potassium, and magnesium. These nutrients can lower blood pressure. The foods that are highest in these nutrients are fruits, vegetables, low-fat dairy products, nuts, seeds, and legumes. But taking calcium, potassium, and magnesium supplements instead of eating foods that are high in those nutrients does not have the same effect. The DASH diet also includes whole grains, fish, and poultry. The DASH diet is one of several lifestyle changes your doctor may recommend to lower your high blood pressure. Your doctor may also want you to decrease the amount of sodium in your diet. Lowering sodium while following the DASH diet can lower blood pressure even further than just the DASH diet alone. Follow-up care is a key part of your treatment and safety. Be sure to make and go to all appointments, and call your doctor if you are having problems. It's also a good idea to know your test results and keep a list of the medicines you take. How can you care for yourself at home? Following the DASH diet · Eat 4 to 5 servings of fruit each day. A serving is 1 medium-sized piece of fruit, ½ cup chopped or canned fruit, 1/4 cup dried fruit, or 4 ounces (½ cup) of fruit juice. Choose fruit more often than fruit juice. · Eat 4 to 5 servings of vegetables each day. A serving is 1 cup of lettuce or raw leafy vegetables, ½ cup of chopped or cooked vegetables, or 4 ounces (½ cup) of vegetable juice. Choose vegetables more often than vegetable juice. · Get 2 to 3 servings of low-fat and fat-free dairy each day. A serving is 8 ounces of milk, 1 cup of yogurt, or 1 ½ ounces of cheese. · Eat 6 to 8 servings of grains each day. A serving is 1 slice of bread, 1 ounce of dry cereal, or ½ cup of cooked rice, pasta, or cooked cereal. Try to choose whole-grain products as much as possible. · Limit lean meat, poultry, and fish to 2 servings each day. A serving is 3 ounces, about the size of a deck of cards. · Eat 4 to 5 servings of nuts, seeds, and legumes (cooked dried beans, lentils, and split peas) each week. A serving is 1/3 cup of nuts, 2 tablespoons of seeds, or ½ cup of cooked beans or peas. · Limit fats and oils to 2 to 3 servings each day. A serving is 1 teaspoon of vegetable oil or 2 tablespoons of salad dressing. · Limit sweets and added sugars to 5 servings or less a week. A serving is 1 tablespoon jelly or jam, ½ cup sorbet, or 1 cup of lemonade. · Eat less than 2,300 milligrams (mg) of sodium a day. If you limit your sodium to 1,500 mg a day, you can lower your blood pressure even more. Tips for success · Start small. Do not try to make dramatic changes to your diet all at once. You might feel that you are missing out on your favorite foods and then be more likely to not follow the plan. Make small changes, and stick with them. Once those changes become habit, add a few more changes. · Try some of the following: ¨ Make it a goal to eat a fruit or vegetable at every meal and at snacks. This will make it easy to get the recommended amount of fruits and vegetables each day. ¨ Try yogurt topped with fruit and nuts for a snack or healthy dessert. ¨ Add lettuce, tomato, cucumber, and onion to sandwiches. ¨ Combine a ready-made pizza crust with low-fat mozzarella cheese and lots of vegetable toppings. Try using tomatoes, squash, spinach, broccoli, carrots, cauliflower, and onions. ¨ Have a variety of cut-up vegetables with a low-fat dip as an appetizer instead of chips and dip. ¨ Sprinkle sunflower seeds or chopped almonds over salads. Or try adding chopped walnuts or almonds to cooked vegetables. ¨ Try some vegetarian meals using beans and peas. Add garbanzo or kidney beans to salads. Make burritos and tacos with mashed moore beans or black beans. Where can you learn more? Go to http://shantel-alina.info/.  
Enter N276 in the search box to learn more about \"DASH Diet: Care Instructions. \" Current as of: September 21, 2016 Content Version: 11.4 © 2987-4392 Healthwise, [employer]. Care instructions adapted under license by [employer] (which disclaims liability or warranty for this information). If you have questions about a medical condition or this instruction, always ask your healthcare professional. [address] any warranty or liability for your use of this information. Introducing hospitals & HEALTH SERVICES! [employer] introduces [employer] patient portal. Now you can access parts of your medical record, email your doctor's office, and request medication refills online. 1. In your internet browser, go to [email or web address] 2. Click on the First Time User? Click Here link in the Sign In box. You will see the New Member Sign Up page. 3. Enter your [employer] Access Code exactly as it appears below. You will not need to use this code after youve completed the sign-up process. If you do not sign up before the expiration date, you must request a new code. · [employer] Access Code: [email or web address] Expires: 2/7/2018  9:18 AM 
 
4. Enter the last four digits of your Social Security Number (xxxx) and Date of Birth (mm/dd/yyyy) as indicated and click Submit. You will be taken to the next sign-up page. 5. Create a [employer] ID. This will be your [employer] login ID and cannot be changed, so think of one that is secure and easy to remember. 6. Create a [employer] password. You can change your password at any time. 7. Enter your Password Reset Question and Answer. This can be used at a later time if you forget your password. 8. Enter your e-mail address. You will receive e-mail notification when new information is available in [address] 9. Click Sign Up. You can now view and download portions of your medical record. 10. Click the Download Summary menu link to download a portable copy of your medical information. If you have questions, please visit the Frequently Asked Questions section of the [employer] website. Remember, [facility] is NOT to be used for urgent needs. For medical emergencies, dial 911. Now available from your iPhone and Android! Please provide this summary of care documentation to your next provider. Your primary care clinician is listed as Ephriam Noss. If you have any questions after today's visit, please call [phone].

## 2017-11-09 NOTE — PROGRESS NOTES
Chief Complaint   Patient presents with    Cyst     states that he has a cyst under his Left axilla, pain with palpation only, states that he has had this for 3 months      1. Have you been to the ER, urgent care clinic since your last visit? Hospitalized since your last visit? No    2. Have you seen or consulted any other health care providers outside of the [address] [place] [address] since your last visit? Include any pap smears or colon screening.  No

## 2017-11-09 NOTE — PATIENT INSTRUCTIONS
Please contact our office if you have any questions about your visit today. High Blood Pressure: Care Instructions  Your Care Instructions    If your blood pressure is usually above 140/90, you have high blood pressure, or hypertension. That means the top number is 140 or higher or the bottom number is 90 or higher, or both. Despite what a lot of people think, high blood pressure usually doesn't cause headaches or make you feel dizzy or lightheaded. It usually has no symptoms. But it does increase your risk for heart attack, stroke, and kidney or eye damage. The higher your blood pressure, the more your risk increases. Your doctor will give you a goal for your blood pressure. Your goal will be based on your health and your age. An example of a goal is to keep your blood pressure below 140/90. Lifestyle changes, such as eating healthy and being active, are always important to help lower blood pressure. You might also take medicine to reach your blood pressure goal.  Follow-up care is a key part of your treatment and safety. Be sure to make and go to all appointments, and call your doctor if you are having problems. It's also a good idea to know your test results and keep a list of the medicines you take. How can you care for yourself at home? Medical treatment  · If you stop taking your medicine, your blood pressure will go back up. You may take one or more types of medicine to lower your blood pressure. Be safe with medicines. Take your medicine exactly as prescribed. Call your doctor if you think you are having a problem with your medicine. · Talk to your doctor before you start taking aspirin every day. Aspirin can help certain people lower their risk of a heart attack or stroke. But taking aspirin isn't right for everyone, because it can cause serious bleeding. · See your doctor regularly. You may need to see the doctor more often at first or until your blood pressure comes down.   · If you are taking blood pressure medicine, talk to your doctor before you take decongestants or anti-inflammatory medicine, such as ibuprofen. Some of these medicines can raise blood pressure. · Learn how to check your blood pressure at home. Lifestyle changes  · Stay at a healthy weight. This is especially important if you put on weight around the waist. Losing even 10 pounds can help you lower your blood pressure. · If your doctor recommends it, get more exercise. Walking is a good choice. Bit by bit, increase the amount you walk every day. Try for at least 30 minutes on most days of the week. You also may want to swim, bike, or do other activities. · Avoid or limit alcohol. Talk to your doctor about whether you can drink any alcohol. · Try to limit how much sodium you eat to less than 2,300 milligrams (mg) a day. Your doctor may ask you to try to eat less than 1,500 mg a day. · Eat plenty of fruits (such as bananas and oranges), vegetables, legumes, whole grains, and low-fat dairy products. · Lower the amount of saturated fat in your diet. Saturated fat is found in animal products such as milk, cheese, and meat. Limiting these foods may help you lose weight and also lower your risk for heart disease. · Do not smoke. Smoking increases your risk for heart attack and stroke. If you need help quitting, talk to your doctor about stop-smoking programs and medicines. These can increase your chances of quitting for good. When should you call for help? Call 911 anytime you think you may need emergency care. This may mean having symptoms that suggest that your blood pressure is causing a serious heart or blood vessel problem. Your blood pressure may be over 180/110. ? For example, call 911 if:  ? · You have symptoms of a heart attack. These may include:  ¨ Chest pain or pressure, or a strange feeling in the chest.  ¨ Sweating. ¨ Shortness of breath. ¨ Nausea or vomiting.   ¨ Pain, pressure, or a strange feeling in the back, neck, jaw, or upper belly or in one or both shoulders or arms. ¨ Lightheadedness or sudden weakness. ¨ A fast or irregular heartbeat. ? · You have symptoms of a stroke. These may include:  ¨ Sudden numbness, tingling, weakness, or loss of movement in your face, arm, or leg, especially on only one side of your body. ¨ Sudden vision changes. ¨ Sudden trouble speaking. ¨ Sudden confusion or trouble understanding simple statements. ¨ Sudden problems with walking or balance. ¨ A sudden, severe headache that is different from past headaches. ? · You have severe back or belly pain. ?Do not wait until your blood pressure comes down on its own. Get help right away. ?Call your doctor now or seek immediate care if:  ? · Your blood pressure is much higher than normal (such as 180/110 or higher), but you don't have symptoms. ? · You think high blood pressure is causing symptoms, such as:  ¨ Severe headache. ¨ Blurry vision. ? Watch closely for changes in your health, and be sure to contact your doctor if:  ? · Your blood pressure measures 140/90 or higher at least 2 times. That means the top number is 140 or higher or the bottom number is 90 or higher, or both. ? · You think you may be having side effects from your blood pressure medicine. ? · Your blood pressure is usually normal, but it goes above normal at least 2 times. Where can you learn more? Go to http://shantel-alina.info/. Enter M752 in the search box to learn more about \"High Blood Pressure: Care Instructions. \"  Current as of: September 21, 2016  Content Version: 11.4  © 4892-3473 [employer]. Care instructions adapted under license by [employer] (which disclaims liability or warranty for this information).  If you have questions about a medical condition or this instruction, always ask your healthcare professional. [place] [address] any warranty or liability for your use of this information. Epidermoid Cyst: Care Instructions  Your Care Instructions  An epidermoid (say \"tg-fwb-ZFM-manohar\") cyst is a lump just under the skin. These cysts can form when a hair follicle becomes blocked. They are common in acne and may occur on the face, neck, back, and genitals. However, they can form anywhere on the body. These cysts are not cancer and do not lead to cancer. They tend not to hurt, but they can sometimes become swollen and painful. They also may break open (rupture) and cause scarring. These cysts sometimes do not cause problems and may not need treatment. If you have a cyst that is swollen and hurts, your doctor may inject it with a medicine to help it heal. But it is more likely that a painful cyst will need to be removed. Your doctor will give you a shot of numbing medicine and cut into the cyst to drain it or remove it. This makes the symptoms go away. Follow-up care is a key part of your treatment and safety. Be sure to make and go to all appointments, and call your doctor if you are having problems. It's also a good idea to know your test results and keep a list of the medicines you take. How can you care for yourself at home? · Do not squeeze the cyst or poke it with a needle to open it. This can cause swelling, redness, and infection. · Always have a doctor look at any new lumps you get to make sure that they are not serious. When should you call for help? Watch closely for changes in your health, and be sure to contact your doctor if:  ? · You have a fever, redness, or swelling after you get a shot of medicine in the cyst.   ? · You see or feel a new lump on your skin. Where can you learn more? Go to http://shantel-alina.info/. Enter Z360 in the search box to learn more about \"Epidermoid Cyst: Care Instructions. \"  Current as of: October 13, 2016  Content Version: 11.4  © 3938-5152 [employer].  Care instructions adapted under license by [employer] (which disclaims liability or warranty for this information). If you have questions about a medical condition or this instruction, always ask your healthcare professional. [address] any warranty or liability for your use of this information. DASH Diet: Care Instructions  Your Care Instructions    The DASH diet is an eating plan that can help lower your blood pressure. DASH stands for Dietary Approaches to Stop Hypertension. Hypertension is high blood pressure. The DASH diet focuses on eating foods that are high in calcium, potassium, and magnesium. These nutrients can lower blood pressure. The foods that are highest in these nutrients are fruits, vegetables, low-fat dairy products, nuts, seeds, and legumes. But taking calcium, potassium, and magnesium supplements instead of eating foods that are high in those nutrients does not have the same effect. The DASH diet also includes whole grains, fish, and poultry. The DASH diet is one of several lifestyle changes your doctor may recommend to lower your high blood pressure. Your doctor may also want you to decrease the amount of sodium in your diet. Lowering sodium while following the DASH diet can lower blood pressure even further than just the DASH diet alone. Follow-up care is a key part of your treatment and safety. Be sure to make and go to all appointments, and call your doctor if you are having problems. It's also a good idea to know your test results and keep a list of the medicines you take. How can you care for yourself at home? Following the DASH diet  · Eat 4 to 5 servings of fruit each day. A serving is 1 medium-sized piece of fruit, ½ cup chopped or canned fruit, 1/4 cup dried fruit, or 4 ounces (½ cup) of fruit juice. Choose fruit more often than fruit juice. · Eat 4 to 5 servings of vegetables each day.  A serving is 1 cup of lettuce or raw leafy vegetables, ½ cup of chopped or cooked vegetables, or 4 ounces (½ cup) of vegetable juice. Choose vegetables more often than vegetable juice. · Get 2 to 3 servings of low-fat and fat-free dairy each day. A serving is 8 ounces of milk, 1 cup of yogurt, or 1 ½ ounces of cheese. · Eat 6 to 8 servings of grains each day. A serving is 1 slice of bread, 1 ounce of dry cereal, or ½ cup of cooked rice, pasta, or cooked cereal. Try to choose whole-grain products as much as possible. · Limit lean meat, poultry, and fish to 2 servings each day. A serving is 3 ounces, about the size of a deck of cards. · Eat 4 to 5 servings of nuts, seeds, and legumes (cooked dried beans, lentils, and split peas) each week. A serving is 1/3 cup of nuts, 2 tablespoons of seeds, or ½ cup of cooked beans or peas. · Limit fats and oils to 2 to 3 servings each day. A serving is 1 teaspoon of vegetable oil or 2 tablespoons of salad dressing. · Limit sweets and added sugars to 5 servings or less a week. A serving is 1 tablespoon jelly or jam, ½ cup sorbet, or 1 cup of lemonade. · Eat less than 2,300 milligrams (mg) of sodium a day. If you limit your sodium to 1,500 mg a day, you can lower your blood pressure even more. Tips for success  · Start small. Do not try to make dramatic changes to your diet all at once. You might feel that you are missing out on your favorite foods and then be more likely to not follow the plan. Make small changes, and stick with them. Once those changes become habit, add a few more changes. · Try some of the following:  ¨ Make it a goal to eat a fruit or vegetable at every meal and at snacks. This will make it easy to get the recommended amount of fruits and vegetables each day. ¨ Try yogurt topped with fruit and nuts for a snack or healthy dessert. ¨ Add lettuce, tomato, cucumber, and onion to sandwiches. ¨ Combine a ready-made pizza crust with low-fat mozzarella cheese and lots of vegetable toppings.  Try using tomatoes, squash, spinach, broccoli, carrots, cauliflower, and onions. ¨ Have a variety of cut-up vegetables with a low-fat dip as an appetizer instead of chips and dip. ¨ Sprinkle sunflower seeds or chopped almonds over salads. Or try adding chopped walnuts or almonds to cooked vegetables. ¨ Try some vegetarian meals using beans and peas. Add garbanzo or kidney beans to salads. Make burritos and tacos with mashed moore beans or black beans. Where can you learn more? Go to http://shantel-alina.info/. Enter B081 in the search box to learn more about \"DASH Diet: Care Instructions. \"  Current as of: September 21, 2016  Content Version: 11.4  © 9793-0516 [employer], [employer]. Care instructions adapted under license by [employer] (which disclaims liability or warranty for this information). If you have questions about a medical condition or this instruction, always ask your healthcare professional. [address] any warranty or liability for your use of this information.

## 2017-11-20 ENCOUNTER — TELEPHONE (OUTPATIENT)
Dept: FAMILY MEDICINE CLINIC | Age: 32
End: 2017-11-20

## 2017-11-20 ENCOUNTER — OFFICE VISIT (OUTPATIENT)
Dept: FAMILY MEDICINE CLINIC | Age: 32
End: 2017-11-20

## 2017-11-20 ENCOUNTER — OFFICE VISIT (OUTPATIENT)
Dept: SURGERY | Age: 32
End: 2017-11-20

## 2017-11-20 VITALS
SYSTOLIC BLOOD PRESSURE: 148 MMHG | RESPIRATION RATE: 18 BRPM | OXYGEN SATURATION: 99 % | HEIGHT: 70 IN | TEMPERATURE: 97.8 F | DIASTOLIC BLOOD PRESSURE: 84 MMHG | BODY MASS INDEX: 33.79 KG/M2 | WEIGHT: 236 LBS | HEART RATE: 83 BPM

## 2017-11-20 VITALS
HEART RATE: 66 BPM | HEIGHT: 70 IN | RESPIRATION RATE: 16 BRPM | SYSTOLIC BLOOD PRESSURE: 184 MMHG | TEMPERATURE: 97.8 F | WEIGHT: 235.6 LBS | BODY MASS INDEX: 33.73 KG/M2 | DIASTOLIC BLOOD PRESSURE: 106 MMHG | OXYGEN SATURATION: 100 %

## 2017-11-20 DIAGNOSIS — R01.1 HEART MURMUR: ICD-10-CM

## 2017-11-20 DIAGNOSIS — M54.42 CHRONIC MIDLINE LOW BACK PAIN WITH LEFT-SIDED SCIATICA: ICD-10-CM

## 2017-11-20 DIAGNOSIS — R22.32 AXILLARY MASS, LEFT: Primary | ICD-10-CM

## 2017-11-20 DIAGNOSIS — I51.9 CARDIAC DISEASE: ICD-10-CM

## 2017-11-20 DIAGNOSIS — I10 ESSENTIAL HYPERTENSION: Primary | ICD-10-CM

## 2017-11-20 DIAGNOSIS — G89.29 CHRONIC MIDLINE LOW BACK PAIN WITH LEFT-SIDED SCIATICA: ICD-10-CM

## 2017-11-20 DIAGNOSIS — F41.9 ANXIETY: ICD-10-CM

## 2017-11-20 RX ORDER — OXYCODONE AND ACETAMINOPHEN 5; 325 MG/1; MG/1
1 TABLET ORAL
Qty: 21 TAB | Refills: 0 | Status: SHIPPED | OUTPATIENT
Start: 2017-11-20 | End: 2019-12-26

## 2017-11-20 RX ORDER — LISINOPRIL 20 MG/1
10 TABLET ORAL DAILY
Qty: 30 TAB | Refills: 0 | Status: SHIPPED | OUTPATIENT
Start: 2017-11-20 | End: 2019-12-26

## 2017-11-20 RX ORDER — HYDROCHLOROTHIAZIDE 25 MG/1
12.5 TABLET ORAL DAILY
Qty: 30 TAB | Refills: 0 | Status: SHIPPED | OUTPATIENT
Start: 2017-11-20 | End: 2017-11-20 | Stop reason: ALTCHOICE

## 2017-11-20 RX ORDER — PREDNISONE 20 MG/1
20 TABLET ORAL 2 TIMES DAILY
Qty: 10 TAB | Refills: 0 | Status: SHIPPED | OUTPATIENT
Start: 2017-11-20 | End: 2019-12-26

## 2017-11-20 RX ORDER — POTASSIUM CHLORIDE 750 MG/1
10 TABLET, EXTENDED RELEASE ORAL DAILY
Qty: 30 TAB | Refills: 0 | Status: SHIPPED | OUTPATIENT
Start: 2017-11-20 | End: 2017-11-20 | Stop reason: ALTCHOICE

## 2017-11-20 NOTE — PATIENT INSTRUCTIONS
If you have any questions or concerns about today's appointment, the verbal and/or written instructions you were given for follow up care, please call our office at [phone].     [facility] [place]  [address] [place] [address] [place] [address]    [phone] office  [phone]

## 2017-11-20 NOTE — PROGRESS NOTES
HPI  Tariq Horvath is a 29 y.o. male  Chief Complaint   Patient presents with    Hypertension     follow up   [facility]     completed on 11-9-17     Reports 6 percocet left from what he obtained from Dr. Anita Lambert back in April. Reports he is going out of town for the holiday and he is very concerned as his blood pressure and pain has increased. Reports his blood pressure was up today. He has called and been unable to get an appointment with Dr. Anita Lambert and his surgeon. Reports his surgeon is on vacation till after the holiday and Dr. Anita Lambert is in office till tomorrow and then out for the holiday and she has no appointments. Reports current pain level 5/10. Reports one percocet today which helped decrease his pain  Reports pain is dull and achy in back with radiation down to left leg. Reports blood pressure 184/106 when he went to see general surgeon for mass under his arm that had resolved. Reports he is sure his blood pressure is related to his pain level as his pain level was 10/10 this morning. Reports pain has increased in the last week. Denies chest pain, shortness of breath, dizziness, or lightheadedness. Does admit to anxiousness. Report he is currently not taking the Lexapro but had considered starting it. 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 Max Daily Amount: 4 Tabs. 21 Tab 0    predniSONE (DELTASONE) 20 mg tablet Take 1 Tab by mouth two (2) times a day.  10 Tab 0    lisinopril (PRINIVIL, ZESTRIL) 20 mg tablet Take 0.5 Tabs by mouth daily. 30 Tab 0    oxyCODONE-acetaminophen (PERCOCET) 5-325 mg per tablet TAKE 1 TABLET BY MOUTH EVERY SIX HOURS AS NEEDED FOR PAIN  0    doxycycline (VIBRAMYCIN) 100 mg capsule Take 100 mg by mouth two (2) times a day.  mupirocin calcium (BACTROBAN) 2 % nasal ointment by Both Nostrils route two (2) times a day.  escitalopram oxalate (LEXAPRO) 10 mg tablet Take 1 Tab by mouth daily. 30 Tab 0    topiramate (TOPAMAX) 25 mg tablet Take 1 in the evening for 1 week, then increase to 2 the second week and continue with 3 in the evening 90 Tab 1       Allergies  Allergies   Allergen Reactions    Ceclor [Cefaclor] Hives    Clindamycin Hives    Sulfa (Sulfonamide Antibiotics) Hives       Family History  Family History   Problem Relation Age of Onset    Diabetes Maternal Grandmother        Social History  Social History     Social History    Marital status:      Spouse name: N/A    Number of children: N/A    Years of education: N/A     Occupational History          Social History Main Topics    Smoking status: Never Smoker    Smokeless tobacco: Never Used    Alcohol use No    Drug use: No    Sexual activity: Not on file     Other Topics Concern    Not on file     Social History Narrative       Problem List  Patient Active Problem List   Diagnosis Code    Neuritis of left lower extremity G57.92    HNP (herniated nucleus pulposus), lumbar M51.26       Review of Systems  Review of Systems   Constitutional: Negative for chills and fever. Eyes: Negative for blurred vision. Respiratory: Negative for shortness of breath. Cardiovascular: Negative for chest pain. Gastrointestinal: Negative for abdominal pain, nausea and vomiting. Genitourinary: Negative for dysuria, frequency and urgency. Musculoskeletal: Positive for back pain and myalgias. Negative for falls. Neurological: Negative for dizziness.    Psychiatric/Behavioral: The patient is nervous/anxious. Vital Signs  Vitals:    11/20/17 1605 11/20/17 1606   BP: 153/86 148/84   Pulse: 83    Resp: 18    Temp: 97.8 °F (36.6 °C)    TempSrc: Oral    SpO2: 99%    Weight: 236 lb (107 kg)    Height: 5' 10\" (1.778 m)    PainSc:   6    PainLoc: Back        Physical Exam  Physical Exam   Constitutional: He is oriented to person, place, and time. HENT:   Mouth/Throat: Oropharynx is clear and moist.   Cardiovascular:   Murmur heard. Pulmonary/Chest: Effort normal and breath sounds normal. No respiratory distress. Abdominal: Soft. Bowel sounds are normal. He exhibits no distension. Musculoskeletal: He exhibits no edema, tenderness or deformity. No tenderness, deformity, bruising, or swelling to back. Neurological: He is alert and oriented to person, place, and time. Psychiatric: He has a normal mood and affect. His behavior is normal.       Diagnostics  Orders Placed This Encounter    LIPID PANEL     Standing Status:   Future     Standing Expiration Date:   5/20/2018    VITAMIN D, 25 HYDROXY     Standing Status:   Future     Standing Expiration Date:   11/20/2018    REFERRAL TO CARDIOLOGY     Referral Priority:   Routine     Referral Type:   Consultation     Referral Reason:   Specialty Services Required     Referred to Provider:   Deonte Lara MD    oxyCODONE-acetaminophen (PERCOCET) 5-325 mg per tablet     Sig: Take 1 Tab by mouth every six (6) hours as needed for Pain. Max Daily Amount: 4 Tabs. Dispense:  21 Tab     Refill:  0    predniSONE (DELTASONE) 20 mg tablet     Sig: Take 1 Tab by mouth two (2) times a day. Dispense:  10 Tab     Refill:  0    DISCONTD: hydroCHLOROthiazide (HYDRODIURIL) 25 mg tablet     Sig: Take 0.5 Tabs by mouth daily. Dispense:  30 Tab     Refill:  0    DISCONTD: potassium chloride (KLOR-CON) 10 mEq tablet     Sig: Take 1 Tab by mouth daily.      Dispense:  30 Tab     Refill:  0    lisinopril (PRINIVIL, ZESTRIL) 20 mg tablet Sig: Take 0.5 Tabs by mouth daily. Dispense:  30 Tab     Refill:  0       Results  Results for orders placed or performed during the hospital encounter of 11/09/17   CBC WITH AUTOMATED DIFF   Result Value Ref Range    WBC 5.8 4.6 - 13.2 K/uL    RBC 5.44 4.70 - 5.50 M/uL    HGB 16.4 (H) 13.0 - 16.0 g/dL    HCT 49.4 (H) 36.0 - 48.0 %    MCV 90.8 74.0 - 97.0 FL    MCH 30.1 24.0 - 34.0 PG    MCHC 33.2 31.0 - 37.0 g/dL    RDW 13.4 11.6 - 14.5 %    PLATELET 052 436 - 116 K/uL    MPV 11.3 9.2 - 11.8 FL    NEUTROPHILS 57 40 - 73 %    LYMPHOCYTES 30 21 - 52 %    MONOCYTES 11 (H) 3 - 10 %    EOSINOPHILS 2 0 - 5 %    BASOPHILS 0 0 - 2 %    ABS. NEUTROPHILS 3.3 1.8 - 8.0 K/UL    ABS. LYMPHOCYTES 1.7 0.9 - 3.6 K/UL    ABS. MONOCYTES 0.7 0.05 - 1.2 K/UL    ABS. EOSINOPHILS 0.1 0.0 - 0.4 K/UL    ABS. BASOPHILS 0.0 0.0 - 0.06 K/UL    DF AUTOMATED     METABOLIC PANEL, COMPREHENSIVE   Result Value Ref Range    Sodium 141 136 - 145 mmol/L    Potassium 3.9 3.5 - 5.5 mmol/L    Chloride 103 100 - 108 mmol/L    CO2 27 21 - 32 mmol/L    Anion gap 11 3.0 - 18 mmol/L    Glucose 83 74 - 99 mg/dL    BUN 13 7.0 - 18 MG/DL    Creatinine 1.09 0.6 - 1.3 MG/DL    BUN/Creatinine ratio 12 12 - 20      GFR est AA >60 >60 ml/min/1.73m2    GFR est non-AA >60 >60 ml/min/1.73m2    Calcium 10.1 8.5 - 10.1 MG/DL    Bilirubin, total 0.3 0.2 - 1.0 MG/DL    ALT (SGPT) 60 16 - 61 U/L    AST (SGOT) 25 15 - 37 U/L    Alk. phosphatase 93 45 - 117 U/L    Protein, total 8.0 6.4 - 8.2 g/dL    Albumin 4.4 3.4 - 5.0 g/dL    Globulin 3.6 2.0 - 4.0 g/dL    A-G Ratio 1.2 0.8 - 1.7           Assessment and Plan  Diagnoses and all orders for this visit:    1. Essential hypertension  -     oxyCODONE-acetaminophen (PERCOCET) 5-325 mg per tablet; Take 1 Tab by mouth every six (6) hours as needed for Pain. Max Daily Amount: 4 Tabs. -     LIPID PANEL; Future  -     lisinopril (PRINIVIL, ZESTRIL) 20 mg tablet; Take 0.5 Tabs by mouth daily.     2. Chronic midline low back pain with left-sided sciatica  -     oxyCODONE-acetaminophen (PERCOCET) 5-325 mg per tablet; Take 1 Tab by mouth every six (6) hours as needed for Pain. Max Daily Amount: 4 Tabs. -     VITAMIN D, 25 HYDROXY; Future  -     predniSONE (DELTASONE) 20 mg tablet; Take 1 Tab by mouth two (2) times a day. 3. Anxiety    4. Heart murmur  -     REFERRAL TO CARDIOLOGY    5. Cardiac disease  -     REFERRAL TO CARDIOLOGY       checked with no concerning activity. Patient aware that only an acute 5 day supply would be given to bridge him till his next appointment with Dr. Jennifer Louis. All labs results reviewed with patient. Patient to obtain vitamin D and lipid prior to next visit. Patient aware that these labs need to be fasting. Discussed signs and symptoms of MI and stroke with patient in detail. Patient verbalized understanding. Lisinopril, prednisone, and percocet side effects, possible allergic reactions and warnings reviewed with patient. Patient verbalized understanding. Discussed case with collaborating physician Erin Martinez. Will place patient on low dose lisinopril per discussion with collaborating. Call to [facility] pharmacy and spoke with Andres Rich. Hydrochlorothiazide and potassium discontinued. After care summary printed and reviewed with patient. Plan reviewed with patient. Questions answered. Patient verbalized understanding of plan and is in agreement with plan. Patient to follow up in two weeks or earlier if symptoms worsen.      ANGELITA Christiansen

## 2017-11-20 NOTE — PROGRESS NOTES
General Surgery Consult    Mili Judge  Admit date: (Not on file)    MRN: [id number]     : 1985     Age: 29 y.o. Attending Physician: Emani Merino MD [facility]      History of Present Illness:      Mili Judge is a 29 y.o. male who presented with A. Left exhibit any mass. The patient stated that he has been having dizziness for about 2-3 months. However he said that early in the morning he was taking a shower and he could not feel the mass. He denies any fever or chills. He denies any night sweats. He has severe hypertension as well as severe back pain, and he was supposed to be scheduled for surgery in the next 2-3 weeks for his back surgery. 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    NEUROLOGICAL PROCEDURE UNLISTED      caudal epidural      Social History   Substance Use Topics    Smoking status: Never Smoker    Smokeless tobacco: Never Used    Alcohol use No      History   Smoking Status    Never Smoker   Smokeless Tobacco    Never Used     Family History   Problem Relation Age of Onset    Diabetes Maternal Grandmother       Current Outpatient Prescriptions   Medication Sig    oxyCODONE-acetaminophen (PERCOCET) 5-325 mg per tablet TAKE 1 TABLET BY MOUTH EVERY SIX HOURS AS NEEDED FOR PAIN    doxycycline (VIBRAMYCIN) 100 mg capsule Take 100 mg by mouth two (2) times a day.  mupirocin calcium (BACTROBAN) 2 % nasal ointment by Both Nostrils route two (2) times a day.  escitalopram oxalate (LEXAPRO) 10 mg tablet Take 1 Tab by mouth daily.     topiramate (TOPAMAX) 25 mg tablet Take 1 in the evening for 1 week, then increase to 2 the second week and continue with 3 in the evening     No current facility-administered medications for this visit. Allergies   Allergen Reactions    Ceclor [Cefaclor] Hives    Clindamycin Hives    Sulfa (Sulfonamide Antibiotics) Hives          Review of Systems:  Pertinent items are noted in the History of Present Illness. Objective:     Visit Vitals    BP (!) 184/106 (BP 1 Location: Right arm, BP Patient Position: Sitting)    Pulse 66    Temp 97.8 °F (36.6 °C) (Oral)    Resp 16    Ht 5' 10\" (1.778 m)    Wt 106.9 kg (235 lb 9.6 oz)    SpO2 100%    BMI 33.81 kg/m2       Physical Exam:      General:  in no apparent distress, alert and oriented times 3   Eyes:  conjunctivae and sclerae normal, pupils equal, round, reactive to light   Throat & Neck: normal, no erythema or exudates noted and neck supple and symmetrical; no palpable masses   Lungs:   clear to auscultation bilaterally   Heart:  Regular rate and rhythm   Abdomen:   rounded, soft, nontender, nondistended, no masses or organomegaly   Left axilla: No palpable masses or enlarged lymph nodes. Right axilla: Normal. No enlarged masses or palpable lymph nodes.         Imaging and Lab Review:     CBC:   Lab Results   Component Value Date/Time    WBC 5.8 11/09/2017 10:00 AM    RBC 5.44 11/09/2017 10:00 AM    HGB 16.4 11/09/2017 10:00 AM    HCT 49.4 11/09/2017 10:00 AM    PLATELET 423 22/18/1661 10:00 AM     BMP:   Lab Results   Component Value Date/Time    Glucose 83 11/09/2017 10:00 AM    Sodium 141 11/09/2017 10:00 AM    Potassium 3.9 11/09/2017 10:00 AM    Chloride 103 11/09/2017 10:00 AM    CO2 27 11/09/2017 10:00 AM    BUN 13 11/09/2017 10:00 AM    Creatinine 1.09 11/09/2017 10:00 AM    Calcium 10.1 11/09/2017 10:00 AM     CMP:  Lab Results   Component Value Date/Time    Glucose 83 11/09/2017 10:00 AM    Sodium 141 11/09/2017 10:00 AM    Potassium 3.9 11/09/2017 10:00 AM    Chloride 103 11/09/2017 10:00 AM    CO2 27 11/09/2017 10:00 AM    BUN 13 11/09/2017 10:00 AM    Creatinine 1.09 11/09/2017 10:00 AM    Calcium 10.1 11/09/2017 10:00 AM    Anion gap 11 11/09/2017 10:00 AM    BUN/Creatinine ratio 12 11/09/2017 10:00 AM    Alk. phosphatase 93 11/09/2017 10:00 AM    Protein, total 8.0 11/09/2017 10:00 AM    Albumin 4.4 11/09/2017 10:00 AM    Globulin 3.6 11/09/2017 10:00 AM    A-G Ratio 1.2 11/09/2017 10:00 AM       No results found for this or any previous visit (from the past 24 hour(s)). images and reports reviewed    Assessment:   Kiki Gasca is a 29 y.o. male is presenting with severe hypertension, back pain, and left axillary mass that has disappeared. This could be a hydradenitis suppurativa, or a sebaceous cyst, or a small lymph nodes that has resolved.     Plan:     Follow up with PCP for his hypertension  Follow up for his back surgery  Follow up with me as needed, since there is no evidence of any axillary mass    Please call me if you have any questions (cell phone: [phone])     Signed By: Shukri Beach MD     November 20, 2017

## 2017-11-20 NOTE — MR AVS SNAPSHOT
Visit Information Date & Time Provider Department Dept. Phone Encounter #  
 11/20/2017  4:00 PM Odalis Givens NP [address] [phone] [id number] Follow-up Instructions Return in about 2 weeks (around 12/4/2017), or if symptoms worsen or fail to improve. Your Appointments 11/29/2017 10:00 AM  
Follow Up with Odalis Givens NP [address] (--) Appt Note: 1 week fu; r/s from 11/17 at the request of the pt  
 [place] [address] [facility] [id number] [phone]  
  
   
 [facility] ([place], [place]) [id number]  
  
    
 11/30/2017  2:00 PM  
ROUTINE CARE with Ramon Freeman MD  
[facility] ([address]) Appt Note: establish care/seen at [address] [facility]. [address] Upcoming Health Maintenance Date Due DTaP/Tdap/Td series (1 - Tdap) 11/19/2008 Allergies as of 11/20/2017  Review Complete On: 11/20/2017 By: Odalis Givens NP Severity Noted Reaction Type Reactions Ceclor [Cefaclor]  10/12/2016    Hives Clindamycin  09/26/2016    Hives Sulfa (Sulfonamide Antibiotics)  09/26/2016    Hives Current Immunizations  Never Reviewed No immunizations on file. Not reviewed this visit You Were Diagnosed With   
  
 Codes Comments Essential hypertension    -  Primary ICD-10-CM: I10 
ICD-9-CM: 401.9 Chronic midline low back pain with left-sided sciatica     ICD-10-CM: M54.42, G89.29 ICD-9-CM: 724.2, 724.3, 338.29 Anxiety     ICD-10-CM: F41.9 ICD-9-CM: 300.00 Heart murmur     ICD-10-CM: R01.1 ICD-9-CM: 971. 2 Cardiac disease     ICD-10-CM: I51.9 ICD-9-CM: 429.9 Vitals BP Pulse Temp Resp Height(growth percentile) Weight(growth percentile) 148/84 83 97.8 °F (36.6 °C) (Oral) 18 5' 10\" (1.778 m) 236 lb (107 kg) SpO2 BMI Smoking Status 99% 33.86 kg/m2 Never Smoker Vitals History BMI and BSA Data Body Mass Index Body Surface Area  
 33.86 kg/m 2 2.3 m 2 Preferred Pharmacy Pharmacy Name Phone [facility]/PHARMACY #[id number] [address] Your Updated Medication List  
  
   
This list is accurate as of: 11/20/17  5:08 PM.  Always use your most recent med list.  
  
  
  
  
 doxycycline 100 mg capsule Commonly known as:  VIBRAMYCIN Take 100 mg by mouth two (2) times a day. escitalopram oxalate 10 mg tablet Commonly known as:  Mikhail Setters Take 1 Tab by mouth daily. lisinopril 20 mg tablet Commonly known as:  Robertha Roup Take 0.5 Tabs by mouth daily. mupirocin calcium 2 % nasal ointment Commonly known as:  BACTROBAN  
by Both Nostrils route two (2) times a day. * oxyCODONE-acetaminophen 5-325 mg per tablet Commonly known as:  PERCOCET TAKE 1 TABLET BY MOUTH EVERY SIX HOURS AS NEEDED FOR PAIN  
  
 * oxyCODONE-acetaminophen 5-325 mg per tablet Commonly known as:  PERCOCET Take 1 Tab by mouth every six (6) hours as needed for Pain. Max Daily Amount: 4 Tabs. predniSONE 20 mg tablet Commonly known as:  Therman Rail Take 1 Tab by mouth two (2) times a day. topiramate 25 mg tablet Commonly known as:  TOPAMAX Take 1 in the evening for 1 week, then increase to 2 the second week and continue with 3 in the evening * Notice: This list has 2 medication(s) that are the same as other medications prescribed for you. Read the directions carefully, and ask your doctor or other care provider to review them with you. Prescriptions Printed Refills  
 oxyCODONE-acetaminophen (PERCOCET) 5-325 mg per tablet 0 Sig: Take 1 Tab by mouth every six (6) hours as needed for Pain. Max Daily Amount: 4 Tabs. Class: Print Route: Oral  
  
Prescriptions Sent to Pharmacy Refills predniSONE (DELTASONE) 20 mg tablet 0 Sig: Take 1 Tab by mouth two (2) times a day. Class: Normal  
 Pharmacy: [facility]/pharmacy [address] [place] [address] Ph #: [phone] Route: Oral  
 lisinopril (PRINIVIL, ZESTRIL) 20 mg tablet 0 Sig: Take 0.5 Tabs by mouth daily. Class: Normal  
 Pharmacy: [facility]/pharmacy [address] [place] [address] Ph #: [phone] Route: Oral  
  
We Performed the Following REFERRAL TO CARDIOLOGY [JMG41 Custom] Comments:  
 Please evaluate and treat patient for hypertension and heart murmur. History of cardiac disease. Follow-up Instructions Return in about 2 weeks (around 12/4/2017), or if symptoms worsen or fail to improve. To-Do List   
 11/20/2017 Lab:  VITAMIN D, 25 HYDROXY Around 02/18/2018 Lab:  LIPID PANEL Referral Information Referral ID Referred By Referred To  
  
 [id number] [facility] [place]   
   [address] [facility]. [address], [address] [place]  Phone: [phone] Fax: [phone] Visits Status Start Date End Date 1 New Request 11/20/17 11/20/18 If your referral has a status of pending review or denied, additional information will be sent to support the outcome of this decision. Patient Instructions Please contact our office if you have any questions about your visit today. Anxiety Disorder: Care Instructions Your Care Instructions Anxiety is a normal reaction to stress. Difficult situations can cause you to have symptoms such as sweaty palms and a nervous feeling. In an anxiety disorder, the symptoms are far more severe. Constant worry, muscle tension, trouble sleeping, nausea and diarrhea, and other symptoms can make normal daily activities difficult or impossible. These symptoms may occur for no reason, and they can affect your work, school, or social life. Medicines, counseling, and self-care can all help. Follow-up care is a key part of your treatment and safety. Be sure to make and go to all appointments, and call your doctor if you are having problems. It's also a good idea to know your test results and keep a list of the medicines you take. How can you care for yourself at home? · Take medicines exactly as directed. Call your doctor if you think you are having a problem with your medicine. · Go to your counseling sessions and follow-up appointments. · Recognize and accept your anxiety. Then, when you are in a situation that makes you anxious, say to yourself, \"This is not an emergency. I feel uncomfortable, but I am not in danger. I can keep going even if I feel anxious. \" · Be kind to your body: ¨ Relieve tension with exercise or a massage. ¨ Get enough rest. 
¨ Avoid alcohol, caffeine, nicotine, and illegal drugs. They can increase your anxiety level and cause sleep problems. ¨ Learn and do relaxation techniques. See below for more about these techniques. · Engage your mind. Get out and do something you enjoy. Go to a Promodity movie, or take a walk or hike. Plan your day. Having too much or too little to do can make you anxious. · Keep a record of your symptoms. Discuss your fears with a good friend or family member, or join a support group for people with similar problems. Talking to others sometimes relieves stress. · Get involved in social groups, or volunteer to help others. Being alone sometimes makes things seem worse than they are. · Get at least 30 minutes of exercise on most days of the week to relieve stress. Walking is a good choice. You also may want to do other activities, such as running, swimming, cycling, or playing tennis or team sports. Relaxation techniques Do relaxation exercises 10 to 20 minutes a day. You can play soothing, relaxing music while you do them, if you wish. · Tell others in your house that you are going to do your relaxation exercises. Ask them not to disturb you. · Find a comfortable place, away from all distractions and noise. · Lie down on your back, or sit with your back straight. · Focus on your breathing. Make it slow and steady. · Breathe in through your nose. Breathe out through either your nose or mouth. · Breathe deeply, filling up the area between your navel and your rib cage. Breathe so that your belly goes up and down. · Do not hold your breath. · Breathe like this for 5 to 10 minutes. Notice the feeling of calmness throughout your whole body. As you continue to breathe slowly and deeply, relax by doing the following for another 5 to 10 minutes: · Tighten and relax each muscle group in your body. You can begin at your toes and work your way up to your head. · Imagine your muscle groups relaxing and becoming heavy. · Empty your mind of all thoughts. · Let yourself relax more and more deeply. · Become aware of the state of calmness that surrounds you. · When your relaxation time is over, you can bring yourself back to alertness by moving your fingers and toes and then your hands and feet and then stretching and moving your entire body. Sometimes people fall asleep during relaxation, but they usually wake up shortly afterward. · Always give yourself time to return to full alertness before you drive a car or do anything that might cause an accident if you are not fully alert. Never play a relaxation tape while you drive a car. When should you call for help? Call 911 anytime you think you may need emergency care. For example, call if: 
? · You feel you cannot stop from hurting yourself or someone else. ? Keep the numbers for these national suicide hotlines: 6-326-755-TALK (3-753.227.5789) and 9-576-USSXATM (5-642.847.2289). If you or someone you know talks about suicide or feeling hopeless, get help right away. ? Watch closely for changes in your health, and be sure to contact your doctor if: 
? · You have anxiety or fear that affects your life. ? · You have symptoms of anxiety that are new or different from those you had before. Where can you learn more? Go to http://shantel-alina.info/. Enter P754 in the search box to learn more about \"Anxiety Disorder: Care Instructions. \" Current as of: May 12, 2017 Content Version: 11.4 © 2006-2017 [id number]. Care instructions adapted under license by [employer] (which disclaims liability or warranty for this information). If you have questions about a medical condition or this instruction, always ask your healthcare professional. [address] any warranty or liability for your use of this information. Hydrochlorothiazide (By mouth) Hydrochlorothiazide (tatyana-droe-klor-ou-XNCS-k-zide) Treats high blood pressure and fluid retention (edema). This medicine is a diuretic (water pill). Brand Name(s): Microzide There may be other brand names for this medicine. When This Medicine Should Not Be Used: This medicine is not right for everyone. Do not use this medicine if you had an allergic reaction to hydrochlorothiazide or a sulfa drug. How to Use This Medicine:  
Capsule, Liquid, Tablet · Take your medicine as directed. Your dose may need to be changed several times to find what works best for you. · Measure the oral liquid medicine with a marked measuring spoon, oral syringe, or medicine cup. · Missed dose: Take a dose as soon as you remember. If it is almost time for your next dose, wait until then and take a regular dose. Do not take extra medicine to make up for a missed dose. · Store the medicine in a closed container at room temperature, away from heat, moisture, and direct light. Drugs and Foods to Avoid: Ask your doctor or pharmacist before using any other medicine, including over-the-counter medicines, vitamins, and herbal products. · Some medicines and foods can affect how hydrochlorothiazide works.  Tell your doctor if you are also using any of the following: ¨ Cholestyramine, colestipol, digoxin, lithium, insulin or other diabetes medicine ¨ An NSAID pain or arthritis medicine (such as aspirin, diclofenac, ibuprofen, naproxen, celecoxib), or a steroid medicine (such as hydrocortisone, methylprednisolone, prednisone, prednisolone, dexamethasone) Warnings While Using This Medicine: · Tell your doctor if you are pregnant or breastfeeding, or if you have kidney disease, liver disease, heart disease or heart failure, high cholesterol, diabetes, gout, trouble urinating, or lupus. · This medicine may cause the following problems: ¨ Glaucoma and other vision problems ¨ Acute gout ¨ Damage to the parathyroid gland ¨ Low or high levels of minerals in your blood (including potassium and sodium) · This medicine may make you dizzy. Do not drive or do anything else that could be dangerous until you know how this medicine affects you. · This medicine could lower your blood pressure too much, especially when you first use it or if you are dehydrated. Stand or sit up slowly if you feel lightheaded or dizzy. Alcohol may make this problem worse. · Tell any doctor or dentist who treats you that you are using this medicine. · Your doctor will check your progress and the effects of this medicine at regular visits. Keep all appointments. · Keep all medicine out of the reach of children. Never share your medicine with anyone. Possible Side Effects While Using This Medicine:  
Call your doctor right away if you notice any of these side effects: · Allergic reaction: Itching or hives, swelling in your face or hands, swelling or tingling in your mouth or throat, chest tightness, trouble breathing · Blistering, peeling, or red skin rash · Confusion, weakness, and muscle twitching · Dry mouth, increased thirst, muscle cramps, nausea or vomiting, uneven heartbeat · Lightheadedness, dizziness, or fainting · Nausea, vomiting, unusual tiredness or weakness, muscle cramps, confusion · Trouble seeing, eye pain, blurred vision or other vision changes If you notice these less serious side effects, talk with your doctor:  
· Headache · Mild diarrhea, constipation, nausea If you notice other side effects that you think are caused by this medicine, tell your doctor. Call your doctor for medical advice about side effects. You may report side effects to FDA at 7-518-EJU-8926 © 2017 [address] Information is for End User's use only and may not be sold, redistributed or otherwise used for commercial purposes. The above information is an  only. It is not intended as medical advice for individual conditions or treatments. Talk to your doctor, nurse or pharmacist before following any medical regimen to see if it is safe and effective for you. Prednisone (By mouth) Prednisone (PRED-ni-sone) Treats many diseases and conditions, especially problems related to inflammation. This medicine is a corticosteroid. Brand Name(s): Contrast Allergy PreMed Pack, Sowmya, predniSONE Intensol There may be other brand names for this medicine. When This Medicine Should Not Be Used: This medicine is not right for everyone. Do not use if you had an allergic reaction to prednisone or if you are pregnant. How to Use This Medicine:  
Liquid, Tablet, Delayed Release Tablet · Take your medicine as directed. Your dose may need to be changed several times to find what works best for you. · It is best to take this medicine with food or milk. · Swallow the delayed-release tablet whole. Do not crush, break, or chew it. · Measure the oral liquid medicine with a marked measuring spoon, oral syringe, or medicine cup. · Missed dose: Take a dose as soon as you remember. If it is almost time for your next dose, wait until then and take a regular dose. Do not take extra medicine to make up for a missed dose. · Store the medicine in a closed container at room temperature, away from heat, moisture, and direct light. Do not freeze the oral liquid. Drugs and Foods to Avoid: Ask your doctor or pharmacist before using any other medicine, including over-the-counter medicines, vitamins, and herbal products. · Tell your doctor if you use any of the following: ¨ Aminoglutethimide, amphotericin B, carbamazepine, cholestyramine, cyclosporine, digoxin, isoniazid, ketoconazole, phenobarbital, phenytoin, or rifampin ¨ Blood thinner, such as warfarin ¨ NSAID pain or arthritis medicine, such as aspirin, diclofenac, ibuprofen, naproxen, celecoxib ¨ Diuretic (water pill) ¨ Diabetes medicine ¨ Macrolide antibiotic, such as azithromycin, clarithromycin, erythromycin ¨ Estrogen, including birth control pills or hormone replacement therapy · This medicine may interfere with vaccines. Ask your doctor before you get a flu shot or any other vaccines. Warnings While Using This Medicine: · It is not safe to take this medicine during pregnancy. It could harm an unborn baby. Tell your doctor right away if you become pregnant. · Tell your doctor if you are breastfeeding or if you have kidney problems, heart failure, high blood pressure, a recent heart attack, diabetes, glaucoma, osteoporosis, or thyroid problems. Tell your doctor about any infection you have. Also tell your doctor if you have had mental or emotional problems (such as depression) or stomach or bowel problems (such as an ulcer or diverticulitis). · This medicine may cause the following problems: ¨ Mood or behavior changes ¨ Higher blood pressure, retaining water, changes in salt or potassium levels in your body ¨ Cataracts or glaucoma (with long-term use) ¨ Weak bones or osteoporosis (with long-term use) ¨ Slow growth in children (with long-term use) ¨ Muscle problems (with high doses, especially if you have myasthenia gravis or similar nerve and muscle problems) · Do not stop using this medicine suddenly. Your doctor will need to slowly decrease your dose before you stop it completely. · This medicine could cause you to get infections more easily. Tell your doctor right away if you are exposed to chicken pox, measles, or other serious infection. Tell your doctor if you had a serious infection in the past, such as tuberculosis or herpes. · Tell your doctor about any extra stress or anxiety in your life. Your dose might need to be changed for a short time. · Tell any doctor or dentist who treats you that you are using this medicine. This medicine may affect certain medical test results. · Keep all medicine out of the reach of children. Never share your medicine with anyone. Possible Side Effects While Using This Medicine:  
Call your doctor right away if you notice any of these side effects: · Allergic reaction: Itching or hives, swelling in your face or hands, swelling or tingling in your mouth or throat, chest tightness, trouble breathing · Dark freckles, skin color changes, coldness, weakness, tiredness, nausea, vomiting, weight loss · Depression, unusual thoughts, feelings, or behaviors, trouble sleeping · Fever, chills, cough, sore throat, and body aches · Muscle pain or weakness · Rapid weight gain, swelling in your hands, ankles, or feet · Severe stomach pain, nausea, vomiting, or red or black stools · Skin changes or growths · Trouble seeing, eye pain, headache If you notice these less serious side effects, talk with your doctor: · Increased appetite · Round, puffy face · Weight gain around your neck, upper back, breast, face, or waist 
If you notice other side effects that you think are caused by this medicine, tell your doctor. Call your doctor for medical advice about side effects. You may report side effects to FDA at 0-239-KOG-9211 © 2017 [address]  Information is for End User's use only and may not be sold, redistributed or otherwise used for commercial purposes. The above information is an  only. It is not intended as medical advice for individual conditions or treatments. Talk to your doctor, nurse or pharmacist before following any medical regimen to see if it is safe and effective for you. Lisinopril (By mouth) Lisinopril (lye-SIN-oh-pril) Treats high blood pressure and heart failure. Also given to reduce the risk of death after a heart attack. This medicine is an ACE inhibitor. Brand Name(s): Prinivil, Qbrelis, Zestril There may be other brand names for this medicine. When This Medicine Should Not Be Used: This medicine is not right for everyone. Do not use it if you had an allergic reaction to lisinopril or another ACE inhibitor, or if you are pregnant. How to Use This Medicine:  
Liquid, Tablet · Take your medicine as directed. Your dose may need to be changed several times to find what works best for you. · Oral liquid: Measure the oral liquid medicine with a marked measuring spoon, oral syringe, or medicine cup. · Missed dose: Take a dose as soon as you remember. If it is almost time for your next dose, wait until then and take a regular dose. Do not take extra medicine to make up for a missed dose. · Store the medicine in a closed container at room temperature, away from heat, moisture, and direct light. Drugs and Foods to Avoid: Ask your doctor or pharmacist before using any other medicine, including over-the-counter medicines, vitamins, and herbal products. · Do not use this medicine together with aliskiren if you have diabetes. · Some foods and medicines may affect how lisinopril works. Tell your doctor if you are using any of the following: ¨ Aliskiren, everolimus, lithium, sirolimus, temsirolimus ¨ Another blood pressure medicine, including an angiotensin receptor blocker (ARB) ¨ Diuretic (water pill, including amiloride, spironolactone, triamterene) ¨ Insulin or diabetes medicine ¨ NSAID pain or arthritis medicine (including aspirin, celecoxib, diclofenac, ibuprofen, naproxen) · Ask your doctor before you use any medicine, supplement, or salt substitute that contains potassium. Warnings While Using This Medicine: · It is not safe to take this medicine during pregnancy. It could harm an unborn baby. Tell your doctor right away if you become pregnant. · Tell your doctor if you are breastfeeding, or if you have kidney disease, liver disease, diabetes, or heart or blood vessel disease. · This medicine may cause the following problems: ¨ Angioedema (severe swelling) ¨ Kidney problems ¨ Serious liver problems · This medicine could lower your blood pressure too much, especially when you first use it or if you are dehydrated. Stand or sit up slowly if you feel lightheaded or dizzy. · Do not stop using this medicine without asking your doctor, even if you feel well. This medicine will not cure your high blood pressure, but it will help keep it in a normal range. You may have to take blood pressure medicine for the rest of your life. · Tell any doctor or dentist who treats you that you are using this medicine. · Your doctor will do lab tests at regular visits to check on the effects of this medicine. Keep all appointments. · Keep all medicine out of the reach of children. Never share your medicine with anyone. Possible Side Effects While Using This Medicine:  
Call your doctor right away if you notice any of these side effects: · Allergic reaction: Itching or hives, swelling in your face or hands, swelling or tingling in your mouth or throat, chest tightness, trouble breathing · Blistering, peeling, or red skin rash · Change in how much or how often you urinate · Confusion, weakness, uneven heartbeat, trouble breathing, numbness or tingling in your hands, feet, or lips · Dark urine or pale stools, nausea, vomiting, loss of appetite, stomach pain, yellow skin or eyes · Fever, chills, sore throat, body aches · Lightheadedness, dizziness, fainting · Severe stomach pain (with or without nausea or vomiting) If you notice these less serious side effects, talk with your doctor: · Dry cough If you notice other side effects that you think are caused by this medicine, tell your doctor. Call your doctor for medical advice about side effects. You may report side effects to FDA at 4-478-OSW-3456 © 2017 [address] Information is for End User's use only and may not be sold, redistributed or otherwise used for commercial purposes. The above information is an  only. It is not intended as medical advice for individual conditions or treatments. Talk to your doctor, nurse or pharmacist before following any medical regimen to see if it is safe and effective for you. Introducing [facility] [place] [facility]! [employer] introduces [employer] patient portal. Now you can access parts of your medical record, email your doctor's office, and request medication refills online. 1. In your internet browser, go to [email or web address] 2. Click on the First Time User? Click Here link in the Sign In box. You will see the New Member Sign Up page. 3. Enter your [employer] Access Code exactly as it appears below. You will not need to use this code after youve completed the sign-up process. If you do not sign up before the expiration date, you must request a new code. · [employer] Access Code: [email or web address] Expires: 2/7/2018  9:18 AM 
 
4. Enter the last four digits of your Social Security Number (xxxx) and Date of Birth (mm/dd/yyyy) as indicated and click Submit. You will be taken to the next sign-up page. 5. Create a 3G Mult[employer]iat ID.  This will be your 3G [employer] login ID and cannot be changed, so think of one that is secure and easy to remember. 6. Create a Slipstream password. You can change your password at any time. 7. Enter your Password Reset Question and Answer. This can be used at a later time if you forget your password. 8. Enter your e-mail address. You will receive e-mail notification when new information is available in [address] 9. Click Sign Up. You can now view and download portions of your medical record. 10. Click the Download Summary menu link to download a portable copy of your medical information. If you have questions, please visit the Frequently Asked Questions section of the [employer] website. Remember, Slipstream is NOT to be used for urgent needs. For medical emergencies, dial 911. Now available from your iPhone and Android! Please provide this summary of care documentation to your next provider. Your primary care clinician is listed as Ge Ayala. If you have any questions after today's visit, please call [phone].

## 2017-11-20 NOTE — LETTER
11/20/2017 11:01 AM 
 
Patient:  Jarett Wilhelm YOB: 1985 Date of Visit: 11/20/2017 Jesi Altamirano NP 
[address] [address] [place] [address] VIA In Basket Dear Jesi Altamirano NP, Thank you for referring Mr. Soraya Calvert to [place] [address] for evaluation and treatment. Below are the relevant portions of my assessment and plan of care. Thank you very much for your referral of Mr. Soraya Calvert. If you have questions, please do not hesitate to call me. I look forward to following Mr. Madelyn Flaherty along with you and will keep you updated as to his progress. Sincerely, Abbey Lisa MD

## 2017-11-20 NOTE — PATIENT INSTRUCTIONS
Please contact our office if you have any questions about your visit today. Anxiety Disorder: Care Instructions  Your Care Instructions    Anxiety is a normal reaction to stress. Difficult situations can cause you to have symptoms such as sweaty palms and a nervous feeling. In an anxiety disorder, the symptoms are far more severe. Constant worry, muscle tension, trouble sleeping, nausea and diarrhea, and other symptoms can make normal daily activities difficult or impossible. These symptoms may occur for no reason, and they can affect your work, school, or social life. Medicines, counseling, and self-care can all help. Follow-up care is a key part of your treatment and safety. Be sure to make and go to all appointments, and call your doctor if you are having problems. It's also a good idea to know your test results and keep a list of the medicines you take. How can you care for yourself at home? · Take medicines exactly as directed. Call your doctor if you think you are having a problem with your medicine. · Go to your counseling sessions and follow-up appointments. · Recognize and accept your anxiety. Then, when you are in a situation that makes you anxious, say to yourself, \"This is not an emergency. I feel uncomfortable, but I am not in danger. I can keep going even if I feel anxious. \"  · Be kind to your body:  ¨ Relieve tension with exercise or a massage. ¨ Get enough rest.  ¨ Avoid alcohol, caffeine, nicotine, and illegal drugs. They can increase your anxiety level and cause sleep problems. ¨ Learn and do relaxation techniques. See below for more about these techniques. · Engage your mind. Get out and do something you enjoy. Go to a funny movie, or take a walk or hike. Plan your day. Having too much or too little to do can make you anxious. · Keep a record of your symptoms. Discuss your fears with a good friend or family member, or join a support group for people with similar problems.  Talking to others sometimes relieves stress. · Get involved in social groups, or volunteer to help others. Being alone sometimes makes things seem worse than they are. · Get at least 30 minutes of exercise on most days of the week to relieve stress. Walking is a good choice. You also may want to do other activities, such as running, swimming, cycling, or playing tennis or team sports. Relaxation techniques  Do relaxation exercises 10 to 20 minutes a day. You can play soothing, relaxing music while you do them, if you wish. · Tell others in your house that you are going to do your relaxation exercises. Ask them not to disturb you. · Find a comfortable place, away from all distractions and noise. · Lie down on your back, or sit with your back straight. · Focus on your breathing. Make it slow and steady. · Breathe in through your nose. Breathe out through either your nose or mouth. · Breathe deeply, filling up the area between your navel and your rib cage. Breathe so that your belly goes up and down. · Do not hold your breath. · Breathe like this for 5 to 10 minutes. Notice the feeling of calmness throughout your whole body. As you continue to breathe slowly and deeply, relax by doing the following for another 5 to 10 minutes:  · Tighten and relax each muscle group in your body. You can begin at your toes and work your way up to your head. · Imagine your muscle groups relaxing and becoming heavy. · Empty your mind of all thoughts. · Let yourself relax more and more deeply. · Become aware of the state of calmness that surrounds you. · When your relaxation time is over, you can bring yourself back to alertness by moving your fingers and toes and then your hands and feet and then stretching and moving your entire body. Sometimes people fall asleep during relaxation, but they usually wake up shortly afterward.   · Always give yourself time to return to full alertness before you drive a car or do anything that might cause an accident if you are not fully alert. Never play a relaxation tape while you drive a car. When should you call for help? Call 911 anytime you think you may need emergency care. For example, call if:  ? · You feel you cannot stop from hurting yourself or someone else. ? Keep the numbers for these national suicide hotlines: 5-220-914-TALK (0-373.868.5258) and 1-214-TCNJCZL (3-568.859.7024). If you or someone you know talks about suicide or feeling hopeless, get help right away. ? Watch closely for changes in your health, and be sure to contact your doctor if:  ? · You have anxiety or fear that affects your life. ? · You have symptoms of anxiety that are new or different from those you had before. Where can you learn more? Go to http://shantelAppoliciousalina.info/. Enter P754 in the search box to learn more about \"Anxiety Disorder: Care Instructions. \"  Current as of: May 12, 2017  Content Version: 11.4  © 3830-3869 [employer]. Care instructions adapted under license by [employer] (which disclaims liability or warranty for this information). If you have questions about a medical condition or this instruction, always ask your healthcare professional. [address] any warranty or liability for your use of this information. Hydrochlorothiazide (By mouth)   Hydrochlorothiazide (tatyana-droe-klor-fe-GLDA-i-zide)  Treats high blood pressure and fluid retention (edema). This medicine is a diuretic (water pill). Brand Name(s): Microzide   There may be other brand names for this medicine. When This Medicine Should Not Be Used: This medicine is not right for everyone. Do not use this medicine if you had an allergic reaction to hydrochlorothiazide or a sulfa drug. How to Use This Medicine:   Capsule, Liquid, Tablet  · Take your medicine as directed. Your dose may need to be changed several times to find what works best for you.   · Measure the oral liquid medicine with a marked measuring spoon, oral syringe, or medicine cup. · Missed dose: Take a dose as soon as you remember. If it is almost time for your next dose, wait until then and take a regular dose. Do not take extra medicine to make up for a missed dose. · Store the medicine in a closed container at room temperature, away from heat, moisture, and direct light. Drugs and Foods to Avoid:   Ask your doctor or pharmacist before using any other medicine, including over-the-counter medicines, vitamins, and herbal products. · Some medicines and foods can affect how hydrochlorothiazide works. Tell your doctor if you are also using any of the following:   ¨ Cholestyramine, colestipol, digoxin, lithium, insulin or other diabetes medicine  ¨ An NSAID pain or arthritis medicine (such as aspirin, diclofenac, ibuprofen, naproxen, celecoxib), or a steroid medicine (such as hydrocortisone, methylprednisolone, prednisone, prednisolone, dexamethasone)  Warnings While Using This Medicine:   · Tell your doctor if you are pregnant or breastfeeding, or if you have kidney disease, liver disease, heart disease or heart failure, high cholesterol, diabetes, gout, trouble urinating, or lupus. · This medicine may cause the following problems:  ¨ Glaucoma and other vision problems  ¨ Acute gout  ¨ Damage to the parathyroid gland  ¨ Low or high levels of minerals in your blood (including potassium and sodium)  · This medicine may make you dizzy. Do not drive or do anything else that could be dangerous until you know how this medicine affects you. · This medicine could lower your blood pressure too much, especially when you first use it or if you are dehydrated. Stand or sit up slowly if you feel lightheaded or dizzy. Alcohol may make this problem worse. · Tell any doctor or dentist who treats you that you are using this medicine. · Your doctor will check your progress and the effects of this medicine at regular visits.  Keep all appointments. · Keep all medicine out of the reach of children. Never share your medicine with anyone. Possible Side Effects While Using This Medicine:   Call your doctor right away if you notice any of these side effects:  · Allergic reaction: Itching or hives, swelling in your face or hands, swelling or tingling in your mouth or throat, chest tightness, trouble breathing  · Blistering, peeling, or red skin rash  · Confusion, weakness, and muscle twitching  · Dry mouth, increased thirst, muscle cramps, nausea or vomiting, uneven heartbeat  · Lightheadedness, dizziness, or fainting  · Nausea, vomiting, unusual tiredness or weakness, muscle cramps, confusion  · Trouble seeing, eye pain, blurred vision or other vision changes  If you notice these less serious side effects, talk with your doctor:   · Headache  · Mild diarrhea, constipation, nausea  If you notice other side effects that you think are caused by this medicine, tell your doctor. Call your doctor for medical advice about side effects. You may report side effects to FDA at 6-066-FDA-2194  © 2017 [address]  Information is for End User's use only and may not be sold, redistributed or otherwise used for commercial purposes. The above information is an  only. It is not intended as medical advice for individual conditions or treatments. Talk to your doctor, nurse or pharmacist before following any medical regimen to see if it is safe and effective for you. Prednisone (By mouth)   Prednisone (PRED-ni-sone)  Treats many diseases and conditions, especially problems related to inflammation. This medicine is a corticosteroid. Brand Name(s): Contrast Allergy PreMed Pack, [place], predniSONE Intensol   There may be other brand names for this medicine. When This Medicine Should Not Be Used: This medicine is not right for everyone. Do not use if you had an allergic reaction to prednisone or if you are pregnant.   How to Use This Medicine:   Liquid, Tablet, Delayed Release Tablet  · Take your medicine as directed. Your dose may need to be changed several times to find what works best for you. · It is best to take this medicine with food or milk. · Swallow the delayed-release tablet whole. Do not crush, break, or chew it. · Measure the oral liquid medicine with a marked measuring spoon, oral syringe, or medicine cup. · Missed dose: Take a dose as soon as you remember. If it is almost time for your next dose, wait until then and take a regular dose. Do not take extra medicine to make up for a missed dose. · Store the medicine in a closed container at room temperature, away from heat, moisture, and direct light. Do not freeze the oral liquid. Drugs and Foods to Avoid:   Ask your doctor or pharmacist before using any other medicine, including over-the-counter medicines, vitamins, and herbal products. · Tell your doctor if you use any of the following:  ¨ Aminoglutethimide, amphotericin B, carbamazepine, cholestyramine, cyclosporine, digoxin, isoniazid, ketoconazole, phenobarbital, phenytoin, or rifampin  ¨ Blood thinner, such as warfarin  ¨ NSAID pain or arthritis medicine, such as aspirin, diclofenac, ibuprofen, naproxen, celecoxib  ¨ Diuretic (water pill)  ¨ Diabetes medicine  ¨ Macrolide antibiotic, such as azithromycin, clarithromycin, erythromycin  ¨ Estrogen, including birth control pills or hormone replacement therapy  · This medicine may interfere with vaccines. Ask your doctor before you get a flu shot or any other vaccines. Warnings While Using This Medicine:   · It is not safe to take this medicine during pregnancy. It could harm an unborn baby. Tell your doctor right away if you become pregnant. · Tell your doctor if you are breastfeeding or if you have kidney problems, heart failure, high blood pressure, a recent heart attack, diabetes, glaucoma, osteoporosis, or thyroid problems.  Tell your doctor about any infection you have. Also tell your doctor if you have had mental or emotional problems (such as depression) or stomach or bowel problems (such as an ulcer or diverticulitis). · This medicine may cause the following problems:  ¨ Mood or behavior changes  ¨ Higher blood pressure, retaining water, changes in salt or potassium levels in your body  ¨ Cataracts or glaucoma (with long-term use)  ¨ Weak bones or osteoporosis (with long-term use)  ¨ Slow growth in children (with long-term use)  ¨ Muscle problems (with high doses, especially if you have myasthenia gravis or similar nerve and muscle problems)  · Do not stop using this medicine suddenly. Your doctor will need to slowly decrease your dose before you stop it completely. · This medicine could cause you to get infections more easily. Tell your doctor right away if you are exposed to chicken pox, measles, or other serious infection. Tell your doctor if you had a serious infection in the past, such as tuberculosis or herpes. · Tell your doctor about any extra stress or anxiety in your life. Your dose might need to be changed for a short time. · Tell any doctor or dentist who treats you that you are using this medicine. This medicine may affect certain medical test results. · Keep all medicine out of the reach of children. Never share your medicine with anyone.   Possible Side Effects While Using This Medicine:   Call your doctor right away if you notice any of these side effects:  · Allergic reaction: Itching or hives, swelling in your face or hands, swelling or tingling in your mouth or throat, chest tightness, trouble breathing  · Dark freckles, skin color changes, coldness, weakness, tiredness, nausea, vomiting, weight loss  · Depression, unusual thoughts, feelings, or behaviors, trouble sleeping  · Fever, chills, cough, sore throat, and body aches  · Muscle pain or weakness  · Rapid weight gain, swelling in your hands, ankles, or feet  · Severe stomach pain, nausea, vomiting, or red or black stools  · Skin changes or growths  · Trouble seeing, eye pain, headache  If you notice these less serious side effects, talk with your doctor:   · Increased appetite  · Round, puffy face  · Weight gain around your neck, upper back, breast, face, or waist  If you notice other side effects that you think are caused by this medicine, tell your doctor. Call your doctor for medical advice about side effects. You may report side effects to FDA at 9-903-XHQ-8670  © 2017 [address] [employer] Information is for End User's use only and may not be sold, redistributed or otherwise used for commercial purposes. The above information is an  only. It is not intended as medical advice for individual conditions or treatments. Talk to your doctor, nurse or pharmacist before following any medical regimen to see if it is safe and effective for you. Lisinopril (By mouth)   Lisinopril (lye-SIN-oh-pril)  Treats high blood pressure and heart failure. Also given to reduce the risk of death after a heart attack. This medicine is an ACE inhibitor. Brand Name(s): Prinivil, Qbrelis, Zestril   There may be other brand names for this medicine. When This Medicine Should Not Be Used: This medicine is not right for everyone. Do not use it if you had an allergic reaction to lisinopril or another ACE inhibitor, or if you are pregnant. How to Use This Medicine:   Liquid, Tablet  · Take your medicine as directed. Your dose may need to be changed several times to find what works best for you. · Oral liquid: Measure the oral liquid medicine with a marked measuring spoon, oral syringe, or medicine cup. · Missed dose: Take a dose as soon as you remember. If it is almost time for your next dose, wait until then and take a regular dose. Do not take extra medicine to make up for a missed dose.   · Store the medicine in a closed container at room temperature, away from heat, moisture, and direct light.  Drugs and Foods to Avoid:   Ask your doctor or pharmacist before using any other medicine, including over-the-counter medicines, vitamins, and herbal products. · Do not use this medicine together with aliskiren if you have diabetes. · Some foods and medicines may affect how lisinopril works. Tell your doctor if you are using any of the following:   ¨ Aliskiren, everolimus, lithium, sirolimus, temsirolimus  ¨ Another blood pressure medicine, including an angiotensin receptor blocker (ARB)  ¨ Diuretic (water pill, including amiloride, spironolactone, triamterene)  ¨ Insulin or diabetes medicine  ¨ NSAID pain or arthritis medicine (including aspirin, celecoxib, diclofenac, ibuprofen, naproxen)  · Ask your doctor before you use any medicine, supplement, or salt substitute that contains potassium. Warnings While Using This Medicine:   · It is not safe to take this medicine during pregnancy. It could harm an unborn baby. Tell your doctor right away if you become pregnant. · Tell your doctor if you are breastfeeding, or if you have kidney disease, liver disease, diabetes, or heart or blood vessel disease. · This medicine may cause the following problems:  ¨ Angioedema (severe swelling)  ¨ Kidney problems  ¨ Serious liver problems  · This medicine could lower your blood pressure too much, especially when you first use it or if you are dehydrated. Stand or sit up slowly if you feel lightheaded or dizzy. · Do not stop using this medicine without asking your doctor, even if you feel well. This medicine will not cure your high blood pressure, but it will help keep it in a normal range. You may have to take blood pressure medicine for the rest of your life. · Tell any doctor or dentist who treats you that you are using this medicine. · Your doctor will do lab tests at regular visits to check on the effects of this medicine. Keep all appointments. · Keep all medicine out of the reach of children.  Never share your medicine with anyone. Possible Side Effects While Using This Medicine:   Call your doctor right away if you notice any of these side effects:  · Allergic reaction: Itching or hives, swelling in your face or hands, swelling or tingling in your mouth or throat, chest tightness, trouble breathing  · Blistering, peeling, or red skin rash  · Change in how much or how often you urinate  · Confusion, weakness, uneven heartbeat, trouble breathing, numbness or tingling in your hands, feet, or lips  · Dark urine or pale stools, nausea, vomiting, loss of appetite, stomach pain, yellow skin or eyes  · Fever, chills, sore throat, body aches  · Lightheadedness, dizziness, fainting  · Severe stomach pain (with or without nausea or vomiting)  If you notice these less serious side effects, talk with your doctor:   · Dry cough  If you notice other side effects that you think are caused by this medicine, tell your doctor. Call your doctor for medical advice about side effects. You may report side effects to FDA at 4-646-FDA-2479  © 2017 [address]  Information is for End User's use only and may not be sold, redistributed or otherwise used for commercial purposes. The above information is an  only. It is not intended as medical advice for individual conditions or treatments. Talk to your doctor, nurse or pharmacist before following any medical regimen to see if it is safe and effective for you.

## 2017-11-20 NOTE — PROGRESS NOTES
Gabriella Mooney 29 y.o. male   Chief Complaint   Patient presents with    Hypertension     follow up    Labs     completed on 11-9-17         1. Have you been to the ER, urgent care clinic since your last visit? Hospitalized since your last visit? No    2. Have you seen or consulted any other health care providers outside of the [address] [place] [address] since your last visit? Include any pap smears or colon screening.  No

## 2017-11-20 NOTE — TELEPHONE ENCOUNTER
Spoke with patient after he verified his name, , and address. Patient states that he is in pain and has called the surgeon but he his out of town. Reports he is anxious as his blood pressure is elevated. Discussed signs and symptoms of MI and stroke. Patient agrees to come in for a visit today.  [facility]

## 2017-11-20 NOTE — PROGRESS NOTES
Luh Salinas is a 29 y.o. male who presents for a surgical evaluation of a left axillary nodule with an onset of three months ago.

## 2019-12-26 ENCOUNTER — OFFICE VISIT (OUTPATIENT)
Dept: FAMILY MEDICINE CLINIC | Age: 34
End: 2019-12-26

## 2019-12-26 VITALS
OXYGEN SATURATION: 97 % | TEMPERATURE: 97.8 F | HEART RATE: 92 BPM | RESPIRATION RATE: 22 BRPM | BODY MASS INDEX: 35.79 KG/M2 | SYSTOLIC BLOOD PRESSURE: 152 MMHG | HEIGHT: 70 IN | WEIGHT: 250 LBS | DIASTOLIC BLOOD PRESSURE: 92 MMHG

## 2019-12-26 DIAGNOSIS — K21.9 GASTROESOPHAGEAL REFLUX DISEASE WITHOUT ESOPHAGITIS: ICD-10-CM

## 2019-12-26 DIAGNOSIS — R68.89 FLU-LIKE SYMPTOMS: ICD-10-CM

## 2019-12-26 DIAGNOSIS — J02.9 SORE THROAT: ICD-10-CM

## 2019-12-26 DIAGNOSIS — E55.9 VITAMIN D DEFICIENCY: ICD-10-CM

## 2019-12-26 DIAGNOSIS — R03.0 ELEVATED BLOOD PRESSURE READING WITHOUT DIAGNOSIS OF HYPERTENSION: ICD-10-CM

## 2019-12-26 DIAGNOSIS — F41.9 ANXIETY: Primary | ICD-10-CM

## 2019-12-26 DIAGNOSIS — Q24.9 CONGENITAL HEART DISEASE: ICD-10-CM

## 2019-12-26 DIAGNOSIS — J06.9 URI, ACUTE: ICD-10-CM

## 2019-12-26 DIAGNOSIS — E66.01 SEVERE OBESITY (HCC): ICD-10-CM

## 2019-12-26 DIAGNOSIS — R63.5 WEIGHT GAIN: ICD-10-CM

## 2019-12-26 DIAGNOSIS — Z13.220 ENCOUNTER FOR SCREENING FOR LIPID DISORDER: ICD-10-CM

## 2019-12-26 PROBLEM — M54.16 LUMBAR RADICULOPATHY: Status: ACTIVE | Noted: 2017-12-22

## 2019-12-26 LAB
QUICKVUE INFLUENZA TEST: NEGATIVE
S PYO AG THROAT QL: NEGATIVE
VALID INTERNAL CONTROL?: YES
VALID INTERNAL CONTROL?: YES

## 2019-12-26 RX ORDER — OXYCODONE AND ACETAMINOPHEN 5; 325 MG/1; MG/1
1 TABLET ORAL
COMMUNITY
Start: 2018-10-23 | End: 2019-12-26

## 2019-12-26 RX ORDER — IBUPROFEN 400 MG/1
TABLET ORAL
COMMUNITY
Start: 2019-10-16 | End: 2019-12-26

## 2019-12-26 RX ORDER — ALPRAZOLAM 0.5 MG/1
TABLET ORAL
COMMUNITY
End: 2020-02-07

## 2019-12-26 RX ORDER — OMEPRAZOLE 40 MG/1
40 CAPSULE, DELAYED RELEASE ORAL DAILY
Qty: 30 CAP | Refills: 0 | Status: SHIPPED | OUTPATIENT
Start: 2019-12-26 | End: 2020-03-06 | Stop reason: SDUPTHER

## 2019-12-26 RX ORDER — ESCITALOPRAM OXALATE 10 MG/1
10 TABLET ORAL DAILY
Qty: 30 TAB | Refills: 0 | Status: SHIPPED | OUTPATIENT
Start: 2019-12-26 | End: 2020-02-07 | Stop reason: SDUPTHER

## 2019-12-26 NOTE — PROGRESS NOTES
Najma Rowe presents today for   Chief Complaint   Patient presents with    Anxiety     States his anxiety is out of control. Pt is reluctant to have BP taken     Abdominal Pain     nausea. states reflux is causing nausea. Is someone accompanying this pt? No    Is the patient using any DME equipment during OV? No    Depression Screening:  3 most recent PHQ Screens 12/26/2019   Little interest or pleasure in doing things Several days   Feeling down, depressed, irritable, or hopeless Several days   Total Score PHQ 2 2   Trouble falling or staying asleep, or sleeping too much -   Feeling tired or having little energy -   Poor appetite, weight loss, or overeating -   Feeling bad about yourself - or that you are a failure or have let yourself or your family down -   Trouble concentrating on things such as school, work, reading, or watching TV -   Moving or speaking so slowly that other people could have noticed; or the opposite being so fidgety that others notice -   Thoughts of being better off dead, or hurting yourself in some way -   PHQ 9 Score -   How difficult have these problems made it for you to do your work, take care of your home and get along with others -       Learning Assessment:  Learning Assessment 3/28/2017   PRIMARY LEARNER Patient   HIGHEST LEVEL OF EDUCATION - PRIMARY LEARNER  SOME COLLEGE   BARRIERS PRIMARY LEARNER NONE   CO-LEARNER CAREGIVER No   PRIMARY LANGUAGE ENGLISH   LEARNER PREFERENCE PRIMARY DEMONSTRATION   ANSWERED BY patient   RELATIONSHIP SELF       Abuse Screening:  Abuse Screening Questionnaire 12/26/2019   Do you ever feel afraid of your partner? N   Are you in a relationship with someone who physically or mentally threatens you? N   Is it safe for you to go home? Y         Health Maintenance Due   Topic Date Due    DTaP/Tdap/Td series (1 - Tdap) 11/19/1998    Influenza Age 5 to Adult  08/01/2019   .       Health Maintenance reviewed and discussed and ordered per Provider. Coordination of Care  1. Have you been to the ER, urgent care clinic since your last visit? Hospitalized since your last visit? No    2. Have you seen or consulted any other health care providers outside of the [address] [place] [address] since your last visit? Include any pap smears or colon screening. No        Advance Directive:  1. Do you have an advance directive in place? Patient Reply:No    2. If not, would you like material regarding how to put one in place?  Patient Reply: No

## 2019-12-26 NOTE — PATIENT INSTRUCTIONS
Learning About Anxiety Disorders What are anxiety disorders? Anxiety disorders are a type of medical problem. They cause severe anxiety. When you feel anxious, you feel that something bad is about to happen. This feeling interferes with your life. These disorders include: · Generalized anxiety disorder. You feel worried and stressed about many everyday events and activities. This goes on for several months and disrupts your life on most days. · Panic disorder. You have repeated panic attacks. A panic attack is a sudden, intense fear or anxiety. It may make you feel short of breath. Your heart may pound. · Social anxiety disorder. You feel very anxious about what you will say or do in front of people. For example, you may be scared to talk or eat in public. This problem affects your daily life. · Phobias. You are very scared of a specific object, situation, or activity. For example, you may fear spiders, high places, or small spaces. What are the symptoms? Generalized anxiety disorder Symptoms may include: · Feeling worried and stressed about many things almost every day. · Feeling tired or irritable. You may have a hard time concentrating. · Having headaches or muscle aches. · Having a hard time getting to sleep or staying asleep. Panic disorder You may have repeated panic attacks when there is no reason for feeling afraid. You may change your daily activities because you worry that you will have another attack. Symptoms may include: 
· Intense fear, terror, or anxiety. · Trouble breathing or very fast breathing. · Chest pain or tightness. · A heartbeat that races or is not regular. Social anxiety disorder Symptoms may include: · Fear about a social situation, such as eating in front of others or speaking in public. You may worry a lot. Or you may be afraid that something bad will happen. · Anxiety that can cause you to blush, sweat, and feel shaky. · A heartbeat that is faster than normal. 
· A hard time focusing. Phobias Symptoms may include: · More fear than most people of being around an object, being in a situation, or doing an activity. You might also be stressed about the chance of being around the thing you fear. · Worry about losing control, panicking, fainting, or having physical symptoms like a faster heartbeat when you are around the situation or object. How are these disorders treated? Anxiety disorders can be treated with medicines or counseling. A combination of both may be used. Medicines may include: · Antidepressants. These may help your symptoms by keeping chemicals in your brain in balance. · Benzodiazepines. These may give you short-term relief of your symptoms. Some people use cognitive-behavioral therapy. A therapist helps you learn to change stressful or bad thoughts into helpful thoughts. Lead a healthy lifestyle A healthy lifestyle may help you feel better. · Get at least 30 minutes of exercise on most days of the week. Walking is a good choice. · Eat a healthy diet. Include fruits, vegetables, lean proteins, and whole grains in your diet each day. · Try to go to bed at the same time every night. Try for 8 hours of sleep a night. · Find ways to manage stress. Try relaxation exercises. · Avoid alcohol and illegal drugs. Follow-up care is a key part of your treatment and safety. Be sure to make and go to all appointments, and call your doctor if you are having problems. It's also a good idea to know your test results and keep a list of the medicines you take. Where can you learn more? Go to http://shantel-alina.info/. Enter S128 in the search box to learn more about \"Learning About Anxiety Disorders. \" Current as of: May 28, 2019 Content Version: 12.2 © 3451-8568 [employer].  Care instructions adapted under license by [employer] (which disclaims liability or warranty for this information). If you have questions about a medical condition or this instruction, always ask your healthcare professional. [address] any warranty or liability for your use of this information. Elevated Blood Pressure: Care Instructions Your Care Instructions Blood pressure is a measure of how hard the blood pushes against the walls of your arteries. It's normal for blood pressure to go up and down throughout the day. But if it stays up over time, you have high blood pressure. Two numbers tell you your blood pressure. The first number is the systolic pressure. It shows how hard the blood pushes when your heart is pumping. The second number is the diastolic pressure. It shows how hard the blood pushes between heartbeats, when your heart is relaxed and filling with blood. An ideal blood pressure in adults is less than 120/80 (say \"120 over 80\"). High blood pressure is 140/90 or higher. You have high blood pressure if your top number is 140 or higher or your bottom number is 90 or higher, or both. The main test for high blood pressure is simple, fast, and painless. To diagnose high blood pressure, your doctor will test your blood pressure at different times. After testing your blood pressure, your doctor may ask you to test it again when you are home. If you are diagnosed with high blood pressure, you can work with your doctor to make a long-term plan to manage it. Follow-up care is a key part of your treatment and safety. Be sure to make and go to all appointments, and call your doctor if you are having problems. It's also a good idea to know your test results and keep a list of the medicines you take. How can you care for yourself at home? · Do not smoke. Smoking increases your risk for heart attack and stroke. If you need help quitting, talk to your doctor about stop-smoking programs and medicines. These can increase your chances of quitting for good. · Stay at a healthy weight. · Try to limit how much sodium you eat to less than 2,300 milligrams (mg) a day. Your doctor may ask you to try to eat less than 1,500 mg a day. · Be physically active. Get at least 30 minutes of exercise on most days of the week. Walking is a good choice. You also may want to do other activities, such as running, swimming, cycling, or playing tennis or team sports. · Avoid or limit alcohol. Talk to your doctor about whether you can drink any alcohol. · Eat plenty of fruits, vegetables, and low-fat dairy products. Eat less saturated and total fats. · Learn how to check your blood pressure at home. When should you call for help? Call your doctor now or seek immediate medical care if: 
? · Your blood pressure is much higher than normal (such as 180/110 or higher). ? · You think high blood pressure is causing symptoms such as: ¨ Severe headache. ¨ Blurry vision. ? Watch closely for changes in your health, and be sure to contact your doctor if: 
? · You do not get better as expected. Where can you learn more? Go to http://shantelRoswell Park Cancer Institutealina.info/. Enter U595 in the search box to learn more about \"Elevated Blood Pressure: Care Instructions. \" Current as of: September 21, 2016 Content Version: 11.4 © 0743-0349 [employer]. Care instructions adapted under license by [employer] (which disclaims liability or warranty for this information). If you have questions about a medical condition or this instruction, always ask your healthcare professional. [place] [address] any warranty or liability for your use of this information. Escitalopram (By mouth) Escitalopram (tz-agn-OSJ-oh-pram) Treats depression and generalized anxiety disorder (MISAEL). Brand Name(s): Lexapro There may be other brand names for this medicine. When This Medicine Should Not Be Used: This medicine is not right for everyone.  Do not use it if you had an allergic reaction to escitalopram or citalopram. 
How to Use This Medicine:  
Liquid, Tablet · Take this medicine as directed. You may need to take it for a month or more before you feel better. Your dose may need to be changed to find out what works best for you. · Measure the oral liquid medicine with a marked measuring spoon, oral syringe, or medicine cup. · This medicine should come with a Medication Guide. Ask your pharmacist for a copy if you do not have one. · Missed dose: Take a dose as soon as you remember. If it is almost time for your next dose, wait until then and take a regular dose. Do not take extra medicine to make up for a missed dose. · Store the medicine in a closed container at room temperature, away from heat, moisture, and direct light. Drugs and Foods to Avoid: Ask your doctor or pharmacist before using any other medicine, including over-the-counter medicines, vitamins, and herbal products. · Do not use this medicine together with pimozide. Do not use this medicine and an MAO inhibitor (MAOI) within 14 days of each other. · Some medicines can affect how escitalopram works. Tell your doctor if you are using the following:  
¨ Buspirone, carbamazepine, fentanyl, lithium, Padmaja's wort, tramadol, or tryptophan supplements ¨ Amphetamines ¨ Blood thinner (including warfarin) ¨ Diuretic (water pill) ¨ NSAID pain or arthritis medicine (including aspirin, celecoxib, diclofenac, ibuprofen, naproxen) ¨ Triptan medicine to treat migraine headaches (including sumatriptan) · Tell your doctor if you use anything else that makes you sleepy. Some examples are allergy medicine, narcotic pain medicine, and alcohol. · Do not drink alcohol while you are using this medicine. Warnings While Using This Medicine: · Tell your doctor if you are pregnant or breastfeeding, or if you have kidney disease, liver disease, bleeding problems, glaucoma, heart disease, or a seizure disorder. · For some children, teenagers, and young adults, this medicine may increase mental or emotional problems. This may lead to thoughts of suicide and violence. Talk with your doctor right away if you have any thoughts or behavior changes that concern you. Tell your doctor if you or anyone in your family has a history of bipolar disorder or suicide attempts. · This medicine may cause the following problems:  
¨ Serotonin syndrome (more likely when taken with certain medicines) ¨ Low sodium levels ¨ Increased risk of bleeding problems · This medicine may make you dizzy or drowsy. Do not drive or do anything that could be dangerous until you know how this medicine affects you. · Your doctor may want to monitor your child's weight and height, because this medicine may cause decreased appetite and weight loss in children. · Do not stop using this medicine suddenly. Your doctor will need to slowly decrease your dose before you stop it completely. · Your doctor will check your progress and the effects of this medicine at regular visits. Keep all appointments. · Keep all medicine out of the reach of children. Never share your medicine with anyone. Possible Side Effects While Using This Medicine:  
Call your doctor right away if you notice any of these side effects: · Allergic reaction: Itching or hives, swelling in your face or hands, swelling or tingling in your mouth or throat, chest tightness, trouble breathing · Anxiety, restlessness, fever, sweating, muscle spasms, nausea, vomiting, diarrhea, seeing or hearing things that are not there · Confusion, weakness, and muscle twitching · Eye pain, vision changes, seeing halos around lights · Fast, pounding, or uneven heartbeat · Feeling more excited or energetic than usual, racing thoughts, trouble sleeping · Seizures · Thoughts of hurting yourself or others, unusual behavior · Unusual bleeding or bruising If you notice these less serious side effects, talk with your doctor: · Dizziness, drowsiness, or sleepiness · Dry mouth 
· Headache · Nausea, constipation, diarrhea · Sexual problems If you notice other side effects that you think are caused by this medicine, tell your doctor. Call your doctor for medical advice about side effects. You may report side effects to FDA at 0-662-KTN-5866 © 2017 [facility] Information is for End User's use only and may not be sold, redistributed or otherwise used for commercial purposes. The above information is an  only. It is not intended as medical advice for individual conditions or treatments. Talk to your doctor, nurse or pharmacist before following any medical regimen to see if it is safe and effective for you. Gastroesophageal Reflux Disease (GERD): Care Instructions Your Care Instructions Gastroesophageal reflux disease (GERD) is the backward flow of stomach acid into the esophagus. The esophagus is the tube that leads from your throat to your stomach. A one-way valve prevents the stomach acid from moving up into this tube. When you have GERD, this valve does not close tightly enough. If you have mild GERD symptoms including heartburn, you may be able to control the problem with antacids or over-the-counter medicine. Changing your diet, losing weight, and making other lifestyle changes can also help reduce symptoms. Follow-up care is a key part of your treatment and safety. Be sure to make and go to all appointments, and call your doctor if you are having problems. It's also a good idea to know your test results and keep a list of the medicines you take. How can you care for yourself at home? · Take your medicines exactly as prescribed. Call your doctor if you think you are having a problem with your medicine. · Your doctor may recommend over-the-counter medicine.  For mild or occasional indigestion, antacids, such as Tums, Gaviscon, Mylanta, or Maalox, may help. Your doctor also may recommend over-the-counter acid reducers, such as Pepcid AC, Tagamet HB, Zantac 75, or Prilosec. Read and follow all instructions on the label. If you use these medicines often, talk with your doctor. · Change your eating habits. ? It's best to eat several small meals instead of two or three large meals. ? After you eat, wait 2 to 3 hours before you lie down. ? Chocolate, mint, and alcohol can make GERD worse. ? Spicy foods, foods that have a lot of acid (like tomatoes and oranges), and coffee can make GERD symptoms worse in some people. If your symptoms are worse after you eat a certain food, you may want to stop eating that food to see if your symptoms get better. · Do not smoke or chew tobacco. Smoking can make GERD worse. If you need help quitting, talk to your doctor about stop-smoking programs and medicines. These can increase your chances of quitting for good. · If you have GERD symptoms at night, raise the head of your bed 6 to 8 inches by putting the frame on blocks or placing a foam wedge under the head of your mattress. (Adding extra pillows does not work.) · Do not wear tight clothing around your middle. · Lose weight if you need to. Losing just 5 to 10 pounds can help. When should you call for help? Call your doctor now or seek immediate medical care if: 
  · You have new or different belly pain.  
  · Your stools are black and tarlike or have streaks of blood.  
 Watch closely for changes in your health, and be sure to contact your doctor if: 
  · Your symptoms have not improved after 2 days.  
  · Food seems to catch in your throat or chest.  
Where can you learn more? Go to http://shantel-alina.info/. Enter T894 in the search box to learn more about \"Gastroesophageal Reflux Disease (GERD): Care Instructions. \" Current as of: November 7, 2018 Content Version: 12.2 © 5835-4873 [employer]. Care instructions adapted under license by [employer] (which disclaims liability or warranty for this information). If you have questions about a medical condition or this instruction, always ask your healthcare professional. [address] any warranty or liability for your use of this information.

## 2019-12-26 NOTE — PROGRESS NOTES
HPI  Pt previously seen by HORACE Mtz but has not been seen for two years. Says that his anxiety is out of control. Having daily panic attacks. Says that he is struggling. Says that he saw a provider in the recent past who gave him Xanax that he takes occasionally. Didn't feel comfortable with her so hasn't gone back. Took one this AM.  Says he is still very anxious. Feels that he needs to be on a daily medication. Denies thoughts of self harm but says anxiety is interfering with sleep and daily activities. 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 Denies alcohol, illegal drugs, smoking, caffeine. Had been prescribed Lexapro in the past but never took it. Worries about side effects. BP elevated. Denies hx of hypertension. Says he feels that it is related to anxiety. Admits to hx of congential heart disease. Is supposed to follow up with cardiology but hasn't. Has been having nausea for past month daily. Also wakes up with burning in throat. Has taken tums and it kind of helps. Wonders if he has GERD    Also says that he has been having nasal congestion and drainage, sore throat for past 4-5 days and productive cough, tactile fever, body aches for past two days. Wife with similar symptoms. 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  omeprazole (PRILOSEC) 40 mg capsule Take 1 Cap by mouth daily. 30 Cap 0    escitalopram oxalate (LEXAPRO) 10 mg tablet Take 1 Tab by mouth daily.  30 Tab 0    albuterol sulfate (PROAIR RESPICLICK) 90 mcg/actuation aepb Take 2 Puffs by inhalation every four (4) hours as needed for Wheezing or Cough.  1 Inhaler 1       Allergies  Allergies   Allergen Reactions    Ceclor [Cefaclor] Hives    Clindamycin Hives    Sulfa (Sulfonamide Antibiotics) Hives       Family History  Family History   Problem Relation Age of Onset    Diabetes Maternal Grandmother        Social History  Social History     Socioeconomic History    Marital status:      Spouse name: Not on file    Number of children: Not on file    Years of education: Not on file    Highest education level: Not on file   Occupational History    Occupation:    Social Needs    Financial resource strain: Not on file    Food insecurity:     Worry: Not on file     Inability: Not on file    Transportation needs:     Medical: Not on file     Non-medical: Not on file   Tobacco Use    Smoking status: Never Smoker    Smokeless tobacco: Never Used   Substance and Sexual Activity    Alcohol use: No    Drug use: No    Sexual activity: Not on file   Lifestyle    Physical activity:     Days per week: Not on file     Minutes per session: Not on file    Stress: Not on file   Relationships    Social connections:     Talks on phone: Not on file     Gets together: Not on file     Attends Hindu service: Not on file     Active member of club or organization: Not on file     Attends meetings of clubs or organizations: Not on file     Relationship status: Not on file    Intimate partner violence:     Fear of current or ex partner: Not on file     Emotionally abused: Not on file     Physically abused: Not on file     Forced sexual activity: Not on file   Other Topics Concern    Not on file   Social History Narrative    Not on file       Problem List  Patient Active Problem List   Diagnosis Code    Neuritis of left lower extremity G57.92    HNP (herniated nucleus pulposus), lumbar M51.26    Severe obesity ([facility] [address] E66.01  Lumbar radiculopathy M54.16    Anxiety F41.9    Elevated blood pressure reading without diagnosis of hypertension R03.0    Gastroesophageal reflux disease without esophagitis K21.9    URI, acute J06.9    Sore throat J02.9    Flu-like symptoms R68.89    Vitamin D deficiency E55.9    Weight gain R63.5    Encounter for screening for lipid disorder Z13.220       Review of Systems  Review of Systems   Constitutional: Positive for chills, fever and malaise/fatigue. HENT: Positive for congestion and sore throat. Respiratory: Positive for cough and wheezing. Cardiovascular: Negative. Gastrointestinal: Positive for abdominal pain, heartburn and nausea. Negative for blood in stool, constipation, diarrhea, melena and vomiting. Genitourinary: Negative. Neurological: Negative. Psychiatric/Behavioral: Negative for depression, substance abuse and suicidal ideas. The patient is nervous/anxious and has insomnia. Vital Signs  Vitals:    12/26/19 1134 12/26/19 1148   BP: 142/88 (!) 152/92   Pulse: 92    Resp: 22    Temp: 97.8 °F (36.6 °C)    TempSrc: Oral    SpO2: 97%    Weight: 250 lb (113.4 kg)    Height: 5' 10\" (1.778 m)    PainSc:   0 - No pain        Physical Exam  Physical Exam  Vitals signs and nursing note reviewed. Constitutional:       Appearance: Normal appearance. HENT:      Right Ear: Tympanic membrane, ear canal and external ear normal.      Left Ear: Tympanic membrane, ear canal and external ear normal.      Mouth/Throat:      Mouth: Mucous membranes are moist.      Pharynx: Oropharynx is clear. Neck:      Musculoskeletal: Normal range of motion. Cardiovascular:      Rate and Rhythm: Normal rate. Heart sounds: Murmur present. Pulmonary:      Effort: Pulmonary effort is normal.      Breath sounds: Wheezing present. Comments: Faint scattered expiratory wheezes in bilateral posterior upper airways  Abdominal:      General: Abdomen is flat. There is no distension. Palpations: Abdomen is soft. Tenderness: There is tenderness. There is no guarding or rebound. Comments: Slight tenderness upon palpation of left lower quadrant. No rebound tenderness or guarding   Skin:     General: Skin is warm and dry. Neurological:      Mental Status: He is alert and oriented to person, place, and time. Psychiatric:         Attention and Perception: Attention normal.         Mood and Affect: Mood is anxious. Speech: Speech is rapid and pressured. Behavior: Behavior is cooperative. Cognition and Memory: Cognition and memory normal.         Diagnostics  Orders Placed This Encounter    CBC WITH AUTOMATED DIFF     Standing Status:   Future     Standing Expiration Date:   12/26/2020    LIPID PANEL     Standing Status:   Future     Standing Expiration Date:   93/29/5883    METABOLIC PANEL, COMPREHENSIVE     Standing Status:   Future     Standing Expiration Date:   12/26/2020    TSH 3RD GENERATION     Standing Status:   Future     Standing Expiration Date:   12/26/2020    VITAMIN D, 25 HYDROXY     Standing Status:   Future     Standing Expiration Date:   12/25/2020    HEMOGLOBIN A1C WITH EAG     Standing Status:   Future     Standing Expiration Date:   12/26/2020    AMB POC RAPID STREP A    AMB POC RAPID INFLUENZA TEST    DISCONTD: ibuprofen (MOTRIN) 400 mg tablet     Sig: TAKE 1 TABLET BY MOUTH TID    DISCONTD: oxyCODONE-acetaminophen (PERCOCET) 5-325 mg per tablet     Sig: Take 1 Tab by mouth.  ALPRAZolam (XANAX) 0.5 mg tablet     Sig: Take  by mouth.  omeprazole (PRILOSEC) 40 mg capsule     Sig: Take 1 Cap by mouth daily. Dispense:  30 Cap     Refill:  0    escitalopram oxalate (LEXAPRO) 10 mg tablet     Sig: Take 1 Tab by mouth daily. Dispense:  30 Tab     Refill:  0    albuterol sulfate (PROAIR RESPICLICK) 90 mcg/actuation aepb     Sig: Take 2 Puffs by inhalation every four (4) hours as needed for Wheezing or Cough. Dispense:  1 Inhaler     Refill:  1       Results  Results for orders placed or performed during the hospital encounter of 11/09/17   CBC WITH AUTOMATED DIFF   Result Value Ref Range    WBC 5.8 4.6 - 13.2 K/uL    RBC 5.44 4.70 - 5.50 M/uL    HGB 16.4 (H) 13.0 - 16.0 g/dL    HCT 49.4 (H) 36.0 - 48.0 %    MCV 90.8 74.0 - 97.0 FL    MCH 30.1 24.0 - 34.0 PG    MCHC 33.2 31.0 - 37.0 g/dL    RDW 13.4 11.6 - 14.5 %    PLATELET 813 441 - 465 K/uL    MPV 11.3 9.2 - 11.8 FL    NEUTROPHILS 57 40 - 73 %    LYMPHOCYTES 30 21 - 52 %    MONOCYTES 11 (H) 3 - 10 %    EOSINOPHILS 2 0 - 5 %    BASOPHILS 0 0 - 2 %    ABS. NEUTROPHILS 3.3 1.8 - 8.0 K/UL    ABS. LYMPHOCYTES 1.7 0.9 - 3.6 K/UL    ABS. MONOCYTES 0.7 0.05 - 1.2 K/UL    ABS. EOSINOPHILS 0.1 0.0 - 0.4 K/UL    ABS. BASOPHILS 0.0 0.0 - 0.06 K/UL    DF AUTOMATED     METABOLIC PANEL, COMPREHENSIVE   Result Value Ref Range    Sodium 141 136 - 145 mmol/L    Potassium 3.9 3.5 - 5.5 mmol/L    Chloride 103 100 - 108 mmol/L    CO2 27 21 - 32 mmol/L    Anion gap 11 3.0 - 18 mmol/L    Glucose 83 74 - 99 mg/dL    BUN 13 7.0 - 18 MG/DL    Creatinine 1.09 0.6 - 1.3 MG/DL    BUN/Creatinine ratio 12 12 - 20      GFR est AA >60 >60 ml/min/1.73m2    GFR est non-AA >60 >60 ml/min/1.73m2    Calcium 10.1 8.5 - 10.1 MG/DL    Bilirubin, total 0.3 0.2 - 1.0 MG/DL    ALT (SGPT) 60 16 - 61 U/L    AST (SGOT) 25 15 - 37 U/L    Alk. phosphatase 93 45 - 117 U/L    Protein, total 8.0 6.4 - 8.2 g/dL    Albumin 4.4 3.4 - 5.0 g/dL    Globulin 3.6 2.0 - 4.0 g/dL    A-G Ratio 1.2 0.8 - 1.7         Assessment and Plan  Diagnoses and all orders for this visit:    1. Anxiety  -     CBC WITH AUTOMATED DIFF; Future  -     METABOLIC PANEL, COMPREHENSIVE; Future  -     TSH 3RD GENERATION; Future  -     escitalopram oxalate (LEXAPRO) 10 mg tablet; Take 1 Tab by mouth daily. Pt willing to start Lexapro at this time.   Reviewed potential side effects, that he needs to take medication daily and that it may take two weeks to start noticing effect and up to six weeks to be fully effective. Reviewed relaxation techniques, deep breathing and self care    2. Elevated blood pressure reading without diagnosis of hypertension  -     METABOLIC PANEL, COMPREHENSIVE; Future  -     TSH 3RD GENERATION; Future  Pt is to monitor blood pressure at home and bring in log of readings. Reviewed normal blood pressure. Discussed possibility of needing to start medication if it remains elevated    3. Gastroesophageal reflux disease without esophagitis  -     CBC WITH AUTOMATED DIFF; Future  -     omeprazole (PRILOSEC) 40 mg capsule; Take 1 Cap by mouth daily. Reviewed GERD diet    4. URI, acute  -     albuterol sulfate (PROAIR RESPICLICK) 90 mcg/actuation aepb; Take 2 Puffs by inhalation every four (4) hours as needed for Wheezing or Cough. Increase fluid intake, monitor for fever, saline nasal spray, rest.  Cautioned that OTC decongestants may cause elevation of blood pressure    5. Sore throat  -     AMB POC RAPID STREP A  Rapid strep negative    6. Flu-like symptoms  -     AMB POC RAPID INFLUENZA TEST  Rapid flu negative    7. Vitamin D deficiency    8. Weight gain  -     HEMOGLOBIN A1C WITH EAG; Future    9. Encounter for screening for lipid disorder  -     LIPID PANEL; Future    10. Severe obesity ([facility] [address]  -     VITAMIN D, 25 HYDROXY; Future  -     HEMOGLOBIN A1C WITH EAG; Future        After care summary printed and reviewed with patient. Plan reviewed with patient. Questions answered. Patient verbalized understanding of plan and is in agreement with plan. Patient to follow up in one month or earlier if symptoms worsen. Encouraged the use of my chart.     Rafy Leos, BRIDGETC

## 2019-12-31 ENCOUNTER — TELEPHONE (OUTPATIENT)
Dept: FAMILY MEDICINE CLINIC | Age: 34
End: 2019-12-31

## 2019-12-31 NOTE — TELEPHONE ENCOUNTER
Patient called back, doesn't want to be a pest but concerned about possibly going into pneumonia because his sinuses feel better but it seems to be in his chest now. He says this has really \"whipped his butt\". Concerned that he may need an antibiotic at this point.

## 2019-12-31 NOTE — TELEPHONE ENCOUNTER
Pt called and stated that he was seen on 12/26/2019 and would like to know if he can get medication called in for wheezing. Pt stated that symtoms have remained the same but has moved to chest. Pt stated that he has an albuterol inhaler but it is not working. Please advise.

## 2020-01-07 ENCOUNTER — HOSPITAL ENCOUNTER (OUTPATIENT)
Dept: LAB | Age: 35
Discharge: HOME | End: 2020-01-07
Payer: COMMERCIAL

## 2020-01-07 ENCOUNTER — TELEPHONE (OUTPATIENT)
Dept: FAMILY MEDICINE CLINIC | Age: 35
End: 2020-01-07

## 2020-01-07 DIAGNOSIS — R63.5 WEIGHT GAIN: ICD-10-CM

## 2020-01-07 DIAGNOSIS — R03.0 ELEVATED BLOOD PRESSURE READING WITHOUT DIAGNOSIS OF HYPERTENSION: ICD-10-CM

## 2020-01-07 DIAGNOSIS — F41.9 ANXIETY: ICD-10-CM

## 2020-01-07 DIAGNOSIS — K21.9 GASTROESOPHAGEAL REFLUX DISEASE WITHOUT ESOPHAGITIS: ICD-10-CM

## 2020-01-07 DIAGNOSIS — E66.01 SEVERE OBESITY (HCC): ICD-10-CM

## 2020-01-07 DIAGNOSIS — Z13.220 ENCOUNTER FOR SCREENING FOR LIPID DISORDER: ICD-10-CM

## 2020-01-07 LAB
25(OH)D3 SERPL-MCNC: 21.6 NG/ML (ref 30–100)
ALBUMIN SERPL-MCNC: 4.2 G/DL (ref 3.4–5)
ALBUMIN/GLOB SERPL: 1.2 {RATIO} (ref 0.8–1.7)
ALP SERPL-CCNC: 65 U/L (ref 45–117)
ALT SERPL-CCNC: 84 U/L (ref 16–61)
ANION GAP SERPL CALC-SCNC: 6 MMOL/L (ref 3–18)
AST SERPL-CCNC: 32 U/L (ref 10–38)
BASOPHILS # BLD: 0 K/UL (ref 0–0.1)
BASOPHILS NFR BLD: 0 % (ref 0–2)
BILIRUB SERPL-MCNC: 0.3 MG/DL (ref 0.2–1)
BUN SERPL-MCNC: 14 MG/DL (ref 7–18)
BUN/CREAT SERPL: 12 (ref 12–20)
CALCIUM SERPL-MCNC: 9.7 MG/DL (ref 8.5–10.1)
CHLORIDE SERPL-SCNC: 103 MMOL/L (ref 100–111)
CHOLEST SERPL-MCNC: 285 MG/DL
CO2 SERPL-SCNC: 27 MMOL/L (ref 21–32)
CREAT SERPL-MCNC: 1.13 MG/DL (ref 0.6–1.3)
DIFFERENTIAL METHOD BLD: ABNORMAL
EOSINOPHIL # BLD: 0.1 K/UL (ref 0–0.4)
EOSINOPHIL NFR BLD: 1 % (ref 0–5)
ERYTHROCYTE [DISTWIDTH] IN BLOOD BY AUTOMATED COUNT: 13.5 % (ref 11.6–14.5)
EST. AVERAGE GLUCOSE BLD GHB EST-MCNC: 117 MG/DL
GLOBULIN SER CALC-MCNC: 3.6 G/DL (ref 2–4)
GLUCOSE SERPL-MCNC: 154 MG/DL (ref 74–99)
HBA1C MFR BLD: 5.7 % (ref 4.2–5.6)
HCT VFR BLD AUTO: 47.2 % (ref 36–48)
HDLC SERPL-MCNC: 39 MG/DL (ref 40–60)
HDLC SERPL: 7.3 {RATIO} (ref 0–5)
HGB BLD-MCNC: 16.5 G/DL (ref 13–16)
LDLC SERPL CALC-MCNC: 176.4 MG/DL (ref 0–100)
LIPID PROFILE,FLP: ABNORMAL
LYMPHOCYTES # BLD: 1.4 K/UL (ref 0.9–3.6)
LYMPHOCYTES NFR BLD: 23 % (ref 21–52)
MCH RBC QN AUTO: 31 PG (ref 24–34)
MCHC RBC AUTO-ENTMCNC: 35 G/DL (ref 31–37)
MCV RBC AUTO: 88.7 FL (ref 74–97)
MONOCYTES # BLD: 0.5 K/UL (ref 0.05–1.2)
MONOCYTES NFR BLD: 9 % (ref 3–10)
NEUTS SEG # BLD: 4.1 K/UL (ref 1.8–8)
NEUTS SEG NFR BLD: 67 % (ref 40–73)
PLATELET # BLD AUTO: 195 K/UL (ref 135–420)
PMV BLD AUTO: 10.7 FL (ref 9.2–11.8)
POTASSIUM SERPL-SCNC: 4.3 MMOL/L (ref 3.5–5.5)
PROT SERPL-MCNC: 7.8 G/DL (ref 6.4–8.2)
RBC # BLD AUTO: 5.32 M/UL (ref 4.7–5.5)
SODIUM SERPL-SCNC: 136 MMOL/L (ref 136–145)
TRIGL SERPL-MCNC: 348 MG/DL (ref ?–150)
TSH SERPL DL<=0.05 MIU/L-ACNC: 2.02 UIU/ML (ref 0.36–3.74)
VLDLC SERPL CALC-MCNC: 69.6 MG/DL
WBC # BLD AUTO: 6.1 K/UL (ref 4.6–13.2)

## 2020-01-07 PROCEDURE — 80061 LIPID PANEL: CPT

## 2020-01-07 PROCEDURE — 82306 VITAMIN D 25 HYDROXY: CPT

## 2020-01-07 PROCEDURE — 83036 HEMOGLOBIN GLYCOSYLATED A1C: CPT

## 2020-01-07 PROCEDURE — 84443 ASSAY THYROID STIM HORMONE: CPT

## 2020-01-07 PROCEDURE — 85025 COMPLETE CBC W/AUTO DIFF WBC: CPT

## 2020-01-07 PROCEDURE — 80053 COMPREHEN METABOLIC PANEL: CPT

## 2020-01-07 PROCEDURE — 36415 COLL VENOUS BLD VENIPUNCTURE: CPT

## 2020-01-07 NOTE — TELEPHONE ENCOUNTER
Pt called back. Said that he has just started taking Lexapro but concerned that he has not been able to sleep for past two nights. It makes him drowsy during the day. Pt to start taking it at bed time. Wishes to continue with medication. To follow up at end of month as scheduled.  He is agreeable with this plan

## 2020-01-25 PROBLEM — Z13.220 ENCOUNTER FOR SCREENING FOR LIPID DISORDER: Status: RESOLVED | Noted: 2019-12-26 | Resolved: 2020-01-25

## 2020-02-03 DIAGNOSIS — F41.9 ANXIETY: ICD-10-CM

## 2020-02-04 RX ORDER — ESCITALOPRAM OXALATE 10 MG/1
10 TABLET ORAL DAILY
Qty: 30 TAB | Refills: 0 | OUTPATIENT
Start: 2020-02-04

## 2020-02-07 ENCOUNTER — OFFICE VISIT (OUTPATIENT)
Dept: FAMILY MEDICINE CLINIC | Age: 35
End: 2020-02-07

## 2020-02-07 ENCOUNTER — HOSPITAL ENCOUNTER (OUTPATIENT)
Dept: LAB | Age: 35
Discharge: HOME | End: 2020-02-07
Payer: COMMERCIAL

## 2020-02-07 VITALS
HEART RATE: 89 BPM | WEIGHT: 253 LBS | SYSTOLIC BLOOD PRESSURE: 124 MMHG | OXYGEN SATURATION: 98 % | HEIGHT: 70 IN | RESPIRATION RATE: 18 BRPM | DIASTOLIC BLOOD PRESSURE: 82 MMHG | BODY MASS INDEX: 36.22 KG/M2 | TEMPERATURE: 97.4 F

## 2020-02-07 DIAGNOSIS — E78.49 OTHER HYPERLIPIDEMIA: ICD-10-CM

## 2020-02-07 DIAGNOSIS — E66.01 SEVERE OBESITY (HCC): ICD-10-CM

## 2020-02-07 DIAGNOSIS — Z51.81 ENCOUNTER FOR MEDICATION MONITORING: ICD-10-CM

## 2020-02-07 DIAGNOSIS — F41.9 ANXIETY: Primary | ICD-10-CM

## 2020-02-07 PROCEDURE — 36415 COLL VENOUS BLD VENIPUNCTURE: CPT

## 2020-02-07 PROCEDURE — 80307 DRUG TEST PRSMV CHEM ANLYZR: CPT

## 2020-02-07 RX ORDER — ESCITALOPRAM OXALATE 10 MG/1
10 TABLET ORAL DAILY
Qty: 30 TAB | Refills: 0 | Status: SHIPPED | OUTPATIENT
Start: 2020-02-07 | End: 2020-03-06 | Stop reason: SDUPTHER

## 2020-02-07 NOTE — PROGRESS NOTES
HPI  Pt presents for follow up. April Angeles that he is feeling much better since the last visit. Says that the Lexapro has been helping. April Angeles that he had an old prescription of Xanax and has been taking these occasionally. Noted in connect care and  that patient been given prescriptions for Ambien, Xanax, Lamictal and Abilify by psychiatric provider Zachery Guzman. April Angeles that he had seen her last summer and had made a follow up appointment for the end of January. Said that she cancelled appointment but sent in refills for these meds. she didn't have appointment available until March. This information was confirmed by a phone call to [facility] . Said that he never took Ambien, Lamictal, and Abilify. Did take occasional Xanax, including one before this appointment. Said that he didn't feel that he needed these medications. April Angeles he wants to continue with Lexapro. Discussed concern with pt that he had not originally disclosed this information and that he was obtaining controlled substances from a different provider. Pt aglereeable to urine drug screen today    Also reviewed labs results. 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 30 Tab 0    omeprazole (PRILOSEC) 40 mg capsule Take 1 Cap by mouth daily. 30 Cap 0    albuterol sulfate (PROAIR RESPICLICK) 90 mcg/actuation aepb Take 2 Puffs by inhalation every four (4) hours as needed for Wheezing or Cough.  1 Inhaler 1       Allergies  Allergies   Allergen Reactions    Ceclor [Cefaclor] Hives    Clindamycin Hives    Sulfa (Sulfonamide Antibiotics) Hives       Family History  Family History   Problem Relation Age of Onset    Diabetes Maternal Grandmother        Social History  Social History     Socioeconomic History    Marital status:      Spouse name: Not on file    Number of children: Not on file    Years of education: Not on file    Highest education level: Not on file   Occupational History    Occupation:    Social Needs    Financial resource strain: Not on file    Food insecurity:     Worry: Not on file     Inability: Not on file    Transportation needs:     Medical: Not on file     Non-medical: Not on file   Tobacco Use    Smoking status: Never Smoker    Smokeless tobacco: Never Used   Substance and Sexual Activity    Alcohol use: No    Drug use: No    Sexual activity: Not on file   Lifestyle    Physical activity:     Days per week: Not on file     Minutes per session: Not on file    Stress: Not on file   Relationships    Social connections:     Talks on phone: Not on file     Gets together: Not on file     Attends Christian service: Not on file     Active member of club or organization: Not on file     Attends meetings of clubs or organizations: Not on file     Relationship status: Not on file    Intimate partner violence:     Fear of current or ex partner: Not on file     Emotionally abused: Not on file     Physically abused: Not on file     Forced sexual activity: Not on file   Other Topics Concern    Not on file   Social History Narrative    Not on file       Problem List  Patient Active Problem List   Diagnosis Code    Neuritis of left lower extremity G57.92    HNP (herniated nucleus pulposus), lumbar M51.26    Severe obesity ([facility] [address] E66.01    Lumbar radiculopathy M54.16    Anxiety F41.9    Elevated blood pressure reading without diagnosis of hypertension R03.0    Gastroesophageal reflux disease without esophagitis K21.9  URI, acute J06.9    Sore throat J02.9    Flu-like symptoms R68.89    Vitamin D deficiency E55.9    Weight gain R63.5    Congenital heart disease Q24.9    Other hyperlipidemia E78.49    Encounter for medication monitoring Z51.81       Review of Systems  Review of Systems   Constitutional: Negative. Respiratory: Negative. Cardiovascular: Negative. Neurological: Negative. Psychiatric/Behavioral: Positive for depression. Negative for suicidal ideas. The patient is nervous/anxious. Vital Signs  Vitals:    02/07/20 1553   BP: 124/82   Pulse: 89   Resp: 18   Temp: 97.4 °F (36.3 °C)   TempSrc: Oral   SpO2: 98%   Weight: 253 lb (114.8 kg)   Height: 5' 10\" (1.778 m)   PainSc:   0 - No pain       Physical Exam  Physical Exam  Vitals signs and nursing note reviewed. Constitutional:       Appearance: Normal appearance. He is not ill-appearing. Cardiovascular:      Rate and Rhythm: Normal rate and regular rhythm. Heart sounds: Normal heart sounds. Pulmonary:      Effort: Pulmonary effort is normal.      Breath sounds: Normal breath sounds. Skin:     General: Skin is warm and dry. Neurological:      Mental Status: He is alert and oriented to person, place, and time. Diagnostics  Orders Placed This Encounter    COMPLIANCE DRUG SCREEN/PRESCRIPTION MONITORING     Standing Status:   Future     Number of Occurrences:   1     Standing Expiration Date:   2/7/2021    escitalopram oxalate (LEXAPRO) 10 mg tablet     Sig: Take 1 Tab by mouth daily.      Dispense:  30 Tab     Refill:  0       Results  Results for orders placed or performed during the hospital encounter of 01/07/20   CBC WITH AUTOMATED DIFF   Result Value Ref Range    WBC 6.1 4.6 - 13.2 K/uL    RBC 5.32 4.70 - 5.50 M/uL    HGB 16.5 (H) 13.0 - 16.0 g/dL    HCT 47.2 36.0 - 48.0 %    MCV 88.7 74.0 - 97.0 FL    MCH 31.0 24.0 - 34.0 PG    MCHC 35.0 31.0 - 37.0 g/dL    RDW 13.5 11.6 - 14.5 %    PLATELET 288 862 - 601 K/uL    MPV 10.7 9.2 - 11.8 FL    NEUTROPHILS 67 40 - 73 %    LYMPHOCYTES 23 21 - 52 %    MONOCYTES 9 3 - 10 %    EOSINOPHILS 1 0 - 5 %    BASOPHILS 0 0 - 2 %    ABS. NEUTROPHILS 4.1 1.8 - 8.0 K/UL    ABS. LYMPHOCYTES 1.4 0.9 - 3.6 K/UL    ABS. MONOCYTES 0.5 0.05 - 1.2 K/UL    ABS. EOSINOPHILS 0.1 0.0 - 0.4 K/UL    ABS. BASOPHILS 0.0 0.0 - 0.1 K/UL    DF AUTOMATED     LIPID PANEL   Result Value Ref Range    LIPID PROFILE          Cholesterol, total 285 (H) <200 MG/DL    Triglyceride 348 (H) <150 MG/DL    HDL Cholesterol 39 (L) 40 - 60 MG/DL    LDL, calculated 176.4 (H) 0 - 100 MG/DL    VLDL, calculated 69.6 MG/DL    CHOL/HDL Ratio 7.3 (H) 0 - 5.0     METABOLIC PANEL, COMPREHENSIVE   Result Value Ref Range    Sodium 136 136 - 145 mmol/L    Potassium 4.3 3.5 - 5.5 mmol/L    Chloride 103 100 - 111 mmol/L    CO2 27 21 - 32 mmol/L    Anion gap 6 3.0 - 18 mmol/L    Glucose 154 (H) 74 - 99 mg/dL    BUN 14 7.0 - 18 MG/DL    Creatinine 1.13 0.6 - 1.3 MG/DL    BUN/Creatinine ratio 12 12 - 20      GFR est AA >60 >60 ml/min/1.73m2    GFR est non-AA >60 >60 ml/min/1.73m2    Calcium 9.7 8.5 - 10.1 MG/DL    Bilirubin, total 0.3 0.2 - 1.0 MG/DL    ALT (SGPT) 84 (H) 16 - 61 U/L    AST (SGOT) 32 10 - 38 U/L    Alk. phosphatase 65 45 - 117 U/L    Protein, total 7.8 6.4 - 8.2 g/dL    Albumin 4.2 3.4 - 5.0 g/dL    Globulin 3.6 2.0 - 4.0 g/dL    A-G Ratio 1.2 0.8 - 1.7     TSH 3RD GENERATION   Result Value Ref Range    TSH 2.02 0.36 - 3.74 uIU/mL   VITAMIN D, 25 HYDROXY   Result Value Ref Range    Vitamin D 25-Hydroxy 21.6 (L) 30 - 100 ng/mL   HEMOGLOBIN A1C WITH EAG   Result Value Ref Range    Hemoglobin A1c 5.7 (H) 4.2 - 5.6 %    Est. average glucose 117 mg/dL     Assessment and Plan  Diagnoses and all orders for this visit:    1. Anxiety  -     escitalopram oxalate (LEXAPRO) 10 mg tablet; Take 1 Tab by mouth daily. Pt to follow up within one month. discussed with pt that Xanax is not part of the intended treatment plan at this office and that if he wishes to continue with this medication, he will have to follow up with psych provider. He verbalized that he plans to continue tx here    2. Encounter for medication monitoring  -     COMPLIANCE DRUG SCREEN/PRESCRIPTION MONITORING; Future  After checking  and noting that pt had been prescribed Xanax and Ambien, pt agreeable to taking urine drug screen     3. Other hyperlipidemia  Reviewed low cholesterol/ high fiber diet. 4. Severe obesity ([address]        After care summary printed and reviewed with patient. Plan reviewed with patient. Questions answered. Patient verbalized understanding of plan and is in agreement with plan. Patient to follow up in one month or earlier if needed. Encouraged the use of my chart.     ANGELITA Rivera

## 2020-02-07 NOTE — PROGRESS NOTES
Sacha Aguilar presents today for   Chief Complaint   Patient presents with    Anxiety     follow up    Results     completed labs       Is someone accompanying this pt? No    Is the patient using any DME equipment during OV? No    Depression Screening:  3 most recent PHQ Screens 2/7/2020   Little interest or pleasure in doing things Not at all   Feeling down, depressed, irritable, or hopeless Not at all   Total Score PHQ 2 0   Trouble falling or staying asleep, or sleeping too much -   Feeling tired or having little energy -   Poor appetite, weight loss, or overeating -   Feeling bad about yourself - or that you are a failure or have let yourself or your family down -   Trouble concentrating on things such as school, work, reading, or watching TV -   Moving or speaking so slowly that other people could have noticed; or the opposite being so fidgety that others notice -   Thoughts of being better off dead, or hurting yourself in some way -   PHQ 9 Score -   How difficult have these problems made it for you to do your work, take care of your home and get along with others -       Learning Assessment:  Learning Assessment 2/7/2020   PRIMARY LEARNER Patient   HIGHEST LEVEL OF EDUCATION - PRIMARY LEARNER  SOME COLLEGE   BARRIERS PRIMARY LEARNER NONE   CO-LEARNER CAREGIVER No   PRIMARY LANGUAGE ENGLISH   LEARNER PREFERENCE PRIMARY DEMONSTRATION   ANSWERED BY Patient   RELATIONSHIP SELF       Abuse Screening:  Abuse Screening Questionnaire 2/7/2020   Do you ever feel afraid of your partner? N   Are you in a relationship with someone who physically or mentally threatens you? N   Is it safe for you to go home? Y         Health Maintenance Due   Topic Date Due    DTaP/Tdap/Td series (1 - Tdap) 11/19/1998    Influenza Age 5 to Adult  08/01/2019   . Health Maintenance reviewed and discussed and ordered per Provider. Coordination of Care  1. Have you been to the ER, urgent care clinic since your last visit? Hospitalized since your last visit? No    2. Have you seen or consulted any other health care providers outside of the [address] [place] [address] since your last visit? Include any pap smears or colon screening. Advance Directive:  1. Do you have an advance directive in place? Patient Reply:No    2. If not, would you like material regarding how to put one in place?  Patient Reply: No

## 2020-02-13 LAB — DRUGS UR: NORMAL

## 2020-03-06 ENCOUNTER — OFFICE VISIT (OUTPATIENT)
Dept: FAMILY MEDICINE CLINIC | Age: 35
End: 2020-03-06

## 2020-03-06 VITALS
BODY MASS INDEX: 36.36 KG/M2 | TEMPERATURE: 98.3 F | HEIGHT: 70 IN | DIASTOLIC BLOOD PRESSURE: 102 MMHG | HEART RATE: 97 BPM | OXYGEN SATURATION: 98 % | RESPIRATION RATE: 20 BRPM | SYSTOLIC BLOOD PRESSURE: 140 MMHG | WEIGHT: 254 LBS

## 2020-03-06 DIAGNOSIS — E66.01 SEVERE OBESITY (HCC): ICD-10-CM

## 2020-03-06 DIAGNOSIS — K21.9 GASTROESOPHAGEAL REFLUX DISEASE WITHOUT ESOPHAGITIS: ICD-10-CM

## 2020-03-06 DIAGNOSIS — E78.49 OTHER HYPERLIPIDEMIA: ICD-10-CM

## 2020-03-06 DIAGNOSIS — F41.9 ANXIETY: Primary | ICD-10-CM

## 2020-03-06 DIAGNOSIS — R03.0 ELEVATED BLOOD PRESSURE READING: ICD-10-CM

## 2020-03-06 RX ORDER — ARIPIPRAZOLE 10 MG/1
TABLET ORAL
COMMUNITY
Start: 2020-01-31 | End: 2020-06-09

## 2020-03-06 RX ORDER — LAMOTRIGINE 25 MG/1
TABLET ORAL
COMMUNITY
Start: 2020-01-31 | End: 2020-06-09

## 2020-03-06 RX ORDER — BUSPIRONE HYDROCHLORIDE 5 MG/1
5 TABLET ORAL
Qty: 45 TAB | Refills: 0 | Status: SHIPPED | OUTPATIENT
Start: 2020-03-06 | End: 2020-09-17 | Stop reason: SDUPTHER

## 2020-03-06 RX ORDER — OMEPRAZOLE 40 MG/1
40 CAPSULE, DELAYED RELEASE ORAL DAILY
Qty: 90 CAP | Refills: 3 | Status: SHIPPED | OUTPATIENT
Start: 2020-03-06 | End: 2021-12-21 | Stop reason: ALTCHOICE

## 2020-03-06 RX ORDER — ESCITALOPRAM OXALATE 10 MG/1
10 TABLET ORAL DAILY
Qty: 90 TAB | Refills: 0 | Status: SHIPPED | OUTPATIENT
Start: 2020-03-06 | End: 2020-06-02 | Stop reason: SDUPTHER

## 2020-03-06 NOTE — PROGRESS NOTES
Luh Salinas presents today for   Chief Complaint   Patient presents with    Panic Attack     1 month follow up    Medication Refill     requesting refill on reflux  med and lexapro       Is someone accompanying this pt? No    Is the patient using any DME equipment during OV? No    Depression Screening:  3 most recent PHQ Screens 2/7/2020   Little interest or pleasure in doing things Not at all   Feeling down, depressed, irritable, or hopeless Not at all   Total Score PHQ 2 0   Trouble falling or staying asleep, or sleeping too much -   Feeling tired or having little energy -   Poor appetite, weight loss, or overeating -   Feeling bad about yourself - or that you are a failure or have let yourself or your family down -   Trouble concentrating on things such as school, work, reading, or watching TV -   Moving or speaking so slowly that other people could have noticed; or the opposite being so fidgety that others notice -   Thoughts of being better off dead, or hurting yourself in some way -   PHQ 9 Score -   How difficult have these problems made it for you to do your work, take care of your home and get along with others -       Learning Assessment:  Learning Assessment 2/7/2020   PRIMARY LEARNER Patient   HIGHEST LEVEL OF EDUCATION - PRIMARY LEARNER  SOME COLLEGE   BARRIERS PRIMARY LEARNER NONE   CO-LEARNER CAREGIVER No   PRIMARY LANGUAGE ENGLISH   LEARNER PREFERENCE PRIMARY DEMONSTRATION   ANSWERED BY Patient   RELATIONSHIP SELF       Abuse Screening:  Abuse Screening Questionnaire 2/7/2020   Do you ever feel afraid of your partner? N   Are you in a relationship with someone who physically or mentally threatens you? N   Is it safe for you to go home? Y         Health Maintenance Due   Topic Date Due    DTaP/Tdap/Td series (1 - Tdap) 11/19/1998    Influenza Age 5 to Adult  08/01/2019   . Health Maintenance reviewed and discussed and ordered per Provider.     Luh Salinas is updated on all     Coordination of Care  1. Have you been to the ER, urgent care clinic since your last visit? Hospitalized since your last visit? No    2. Have you seen or consulted any other health care providers outside of the [address] [place] [address] since your last visit? Include any pap smears or colon screening. No        Advance Directive:  1. Do you have an advance directive in place? Patient Reply:No    2. If not, would you like material regarding how to put one in place?  Patient Reply: No

## 2020-03-06 NOTE — PROGRESS NOTES
HPI  Presents for follow up. Has brought all of his meds to review    Feels that he has not been having panic attacks. Says he falls asleep but wakes up after two hours. Has taken a couple of Xanax and they help but asking for some other medication. Goes to bed at 9 but then is up at 11 and then can't get to sleep until 3. Is very stressed at work. Says his job is sedentary. Asking for refill for Prilosec. Says the does have symptoms of GERD from time to time but feels it is diet related    Concerned about weight and expressed interest in wanting to lose weight. Said that he and his wife are working on becoming more active    BP elevated today. 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 90 Cap 3    busPIRone (BUSPAR) 5 mg tablet Take 1 Tab by mouth three (3) times daily as needed (anxiety). 45 Tab 0    escitalopram oxalate (LEXAPRO) 10 mg tablet Take 1 Tab by mouth daily. 90 Tab 0    albuterol sulfate (PROAIR RESPICLICK) 90 mcg/actuation aepb Take 2 Puffs by inhalation every four (4) hours as needed for Wheezing or Cough.  1 Inhaler 1    ARIPiprazole (ABILIFY) 10 mg tablet       lamoTRIgine (LAMICTAL) 25 mg tablet          Allergies  Allergies   Allergen Reactions    Ceclor [Cefaclor] Hives    Clindamycin Hives    Sulfa (Sulfonamide Antibiotics) Hives       Family History  Family History   Problem Relation Age of Onset    Diabetes Maternal Grandmother        Social History  Social History     Socioeconomic History    Marital status:      Spouse name: Not on file    Number of children: Not on file    Years of education: Not on file    Highest education level: Not on file   Occupational History    Occupation:    Social Needs    Financial resource strain: Not on file    Food insecurity:     Worry: Not on file     Inability: Not on file    Transportation needs:     Medical: Not on file     Non-medical: Not on file   Tobacco Use    Smoking status: Never Smoker    Smokeless tobacco: Never Used   Substance and Sexual Activity    Alcohol use: No    Drug use: No    Sexual activity: Not on file   Lifestyle    Physical activity:     Days per week: Not on file     Minutes per session: Not on file    Stress: Not on file   Relationships    Social connections:     Talks on phone: Not on file     Gets together: Not on file     Attends Yarsani service: Not on file     Active member of club or organization: Not on file     Attends meetings of clubs or organizations: Not on file     Relationship status: Not on file    Intimate partner violence:     Fear of current or ex partner: Not on file     Emotionally abused: Not on file     Physically abused: Not on file     Forced sexual activity: Not on file   Other Topics Concern    Not on file   Social History Narrative    Not on file       Problem List  Patient Active Problem List   Diagnosis Code    Neuritis of left lower extremity G57.92    HNP (herniated nucleus pulposus), lumbar M51.26    Severe obesity ([address] E66.01    Lumbar radiculopathy M54.16    Anxiety F41.9    Elevated blood pressure reading without diagnosis of hypertension R03.0    Gastroesophageal reflux disease without esophagitis K21.9    URI, acute J06.9    Sore throat J02.9    Flu-like symptoms R68.89    Vitamin D deficiency E55.9    Weight gain R63.5    Congenital heart disease Q24.9    Other hyperlipidemia E78.49    Encounter for medication monitoring Z51.81    Elevated blood pressure reading R03.0       Review of Systems  Review of Systems   Constitutional: Negative. Respiratory: Negative. Cardiovascular: Negative. Gastrointestinal: Positive for heartburn. Neurological: Negative. Psychiatric/Behavioral: Negative for depression. The patient is nervous/anxious and has insomnia. Vital Signs  Vitals:    03/06/20 1419 03/06/20 1435   BP: (!) 152/94 (!) 140/102   Pulse: 97    Resp: 20    Temp: 98.3 °F (36.8 °C)    TempSrc: Oral    SpO2: 98%    Weight: 254 lb (115.2 kg)    Height: 5' 10\" (1.778 m)    PainSc:   0 - No pain        Physical Exam  Physical Exam  Vitals signs and nursing note reviewed. Constitutional:       Appearance: Normal appearance. Cardiovascular:      Rate and Rhythm: Normal rate and regular rhythm. Heart sounds: Normal heart sounds. Pulmonary:      Effort: Pulmonary effort is normal.      Breath sounds: Normal breath sounds. Skin:     General: Skin is warm and dry. Neurological:      Mental Status: He is alert and oriented to person, place, and time. Psychiatric:         Mood and Affect: Mood normal.         Behavior: Behavior normal.         Diagnostics  Orders Placed This Encounter    omeprazole (PRILOSEC) 40 mg capsule     Sig: Take 1 Cap by mouth daily. Dispense:  90 Cap     Refill:  3    ARIPiprazole (ABILIFY) 10 mg tablet    lamoTRIgine (LAMICTAL) 25 mg tablet    busPIRone (BUSPAR) 5 mg tablet     Sig: Take 1 Tab by mouth three (3) times daily as needed (anxiety). Dispense:  45 Tab     Refill:  0    escitalopram oxalate (LEXAPRO) 10 mg tablet     Sig: Take 1 Tab by mouth daily. Dispense:  90 Tab     Refill:  0       Results  Results for orders placed or performed during the hospital encounter of 02/07/20   COMPLIANCE DRUG SCREEN/PRESCRIPTION MONITORING   Result Value Ref Range    Summary FINAL        Assessment and Plan  Diagnoses and all orders for this visit:    1.  Anxiety  -     busPIRone (BUSPAR) 5 mg tablet; Take 1 Tab by mouth three (3) times daily as needed (anxiety). -     escitalopram oxalate (LEXAPRO) 10 mg tablet; Take 1 Tab by mouth daily. 2. Gastroesophageal reflux disease without esophagitis  -     omeprazole (PRILOSEC) 40 mg capsule; Take 1 Cap by mouth daily. 3. Elevated blood pressure reading  Will monitor closely as anxiety gets under better control. 4. Other hyperlipidemia  Reviewed low cholesterol/ high fiber diet, increasing physical activity, weight loss    5. Severe obesity ([address]  As above. Encouraged tracking intake      After care summary printed and reviewed with patient. Plan reviewed with patient. Questions answered. Patient verbalized understanding of plan and is in agreement with plan. Patient to follow up in one week or earlier if symptoms worsen. Return to work letter given. Encouraged the use of my chart.     ANGELITA Welsh

## 2020-06-02 DIAGNOSIS — F41.9 ANXIETY: ICD-10-CM

## 2020-06-04 RX ORDER — ESCITALOPRAM OXALATE 10 MG/1
10 TABLET ORAL DAILY
Qty: 90 TAB | Refills: 0 | Status: SHIPPED | OUTPATIENT
Start: 2020-06-04 | End: 2020-09-08 | Stop reason: SDUPTHER

## 2020-06-09 ENCOUNTER — VIRTUAL VISIT (OUTPATIENT)
Dept: FAMILY MEDICINE CLINIC | Age: 35
End: 2020-06-09

## 2020-06-09 DIAGNOSIS — F41.9 ANXIETY: Primary | ICD-10-CM

## 2020-06-09 NOTE — PROGRESS NOTES
Silva Benavidez presents today for   Chief Complaint   Patient presents with    Medication Question     Want to know if increase in lexapro is possible due to the return iif anxiety. Is someone accompanying this pt? No    Is the patient using any DME equipment during OV? No    Depression Screening:  3 most recent PHQ Screens 2/7/2020   Little interest or pleasure in doing things Not at all   Feeling down, depressed, irritable, or hopeless Not at all   Total Score PHQ 2 0   Trouble falling or staying asleep, or sleeping too much -   Feeling tired or having little energy -   Poor appetite, weight loss, or overeating -   Feeling bad about yourself - or that you are a failure or have let yourself or your family down -   Trouble concentrating on things such as school, work, reading, or watching TV -   Moving or speaking so slowly that other people could have noticed; or the opposite being so fidgety that others notice -   Thoughts of being better off dead, or hurting yourself in some way -   PHQ 9 Score -   How difficult have these problems made it for you to do your work, take care of your home and get along with others -       Learning Assessment:  Learning Assessment 2/7/2020   PRIMARY LEARNER Patient   HIGHEST LEVEL OF EDUCATION - PRIMARY LEARNER  SOME COLLEGE   BARRIERS PRIMARY LEARNER NONE   CO-LEARNER CAREGIVER No   PRIMARY LANGUAGE ENGLISH   LEARNER PREFERENCE PRIMARY DEMONSTRATION   ANSWERED BY Patient   RELATIONSHIP SELF       Abuse Screening:  Abuse Screening Questionnaire 2/7/2020   Do you ever feel afraid of your partner? N   Are you in a relationship with someone who physically or mentally threatens you? N   Is it safe for you to go home? Y         Health Maintenance Due   Topic Date Due    DTaP/Tdap/Td series (1 - Tdap) 11/19/2008   . Health Maintenance reviewed and discussed and ordered per Provider. Coordination of Care  1.  Have you been to the ER, urgent care clinic since your last visit? Hospitalized since your last visit? No    2. Have you seen or consulted any other health care providers outside of the [address] [place] [address] since your last visit? Include any pap smears or colon screening. No      Advance Directive:  1. Do you have an advance directive in place? Patient Reply:No    2. If not, would you like material regarding how to put one in place?  Patient Reply: No

## 2020-06-10 NOTE — PROGRESS NOTES
Ian Meléndez is a 30 y.o. male evaluated via audio only technology on 6/9/2020. Consent: He and/or his health care decision maker is aware that he may receive a bill for this audio only encounter, depending on his insurance coverage, and has provided verbal consent to proceed: Yes    I communicated with the patient and/or health care decision maker about the nature and details of the following:  Assessment & Plan:   Diagnoses and all orders for this visit:    1. Anxiety    Pt will increase dose of Lexapro from 10 mg daily to 20 mg daily. Also is agreeable to trying Buspar before bed to help with sleep. Is to follow up in one month to assess effectiveness of this  . The complexity of medical decision making for this visit is moderate     12  Subjective:   Ian Meléndez is a 30 y.o. male who was seen for Medication Question (Want to know if increase in lexapro is possible due to the return iif anxiety.)  Pt presents for virtual follow up visit regarding anxiety. Reports that he is still taking Lexapro but now he is starting to have panic attacks and racing thoughts. More anxiety despite being on medication. Having trouble sleeping. Denies thoughts of self harm. Recently picked up Rx for Lexapro 10 mg. Says that he hasn't tried Buspar that was previously prescribed. Prior to Admission medications    Medication Sig Start Date End Date Taking? Authorizing Provider   escitalopram oxalate (LEXAPRO) 10 mg tablet Take 1 Tab by mouth daily. 6/4/20  Yes Dania Atwood NP   omeprazole (PRILOSEC) 40 mg capsule Take 1 Cap by mouth daily. 3/6/20  Yes Dania Atwood NP   albuterol sulfate (PROAIR RESPICLICK) 90 mcg/actuation aepb Take 2 Puffs by inhalation every four (4) hours as needed for Wheezing or Cough. 12/26/19  Yes Dania Atwood NP   busPIRone (BUSPAR) 5 mg tablet Take 1 Tab by mouth three (3) times daily as needed (anxiety).  3/6/20   Dania Atwood NP     Allergies Allergen Reactions    Ceclor [Cefaclor] Hives    Clindamycin Hives    Sulfa (Sulfonamide Antibiotics) Hives       Patient Active Problem List   Diagnosis Code    Neuritis of left lower extremity G57.92    HNP (herniated nucleus pulposus), lumbar M51.26    Severe obesity (HCC) E66.01    Lumbar radiculopathy M54.16    Anxiety F41.9    Elevated blood pressure reading without diagnosis of hypertension R03.0    Gastroesophageal reflux disease without esophagitis K21.9    URI, acute J06.9    Sore throat J02.9    Flu-like symptoms R68.89    Vitamin D deficiency E55.9    Weight gain R63.5    Congenital heart disease Q24.9    Other hyperlipidemia E78.49    Encounter for medication monitoring Z51.81    Elevated blood pressure reading R03.0     Patient Active Problem List    Diagnosis Date Noted    Elevated blood pressure reading 03/06/2020    Other hyperlipidemia 02/07/2020    Encounter for medication monitoring 02/07/2020    Severe obesity ([address] 12/26/2019    Anxiety 12/26/2019    Elevated blood pressure reading without diagnosis of hypertension 12/26/2019    Gastroesophageal reflux disease without esophagitis 12/26/2019    URI, acute 12/26/2019    Sore throat 12/26/2019    Flu-like symptoms 12/26/2019    Vitamin D deficiency 12/26/2019    Weight gain 12/26/2019    Congenital heart disease 12/26/2019    Lumbar radiculopathy 12/22/2017    Neuritis of left lower extremity 10/12/2016    HNP (herniated nucleus pulposus), lumbar 10/12/2016     Current Outpatient Medications   Medication Sig Dispense Refill    escitalopram oxalate (LEXAPRO) 10 mg tablet Take 1 Tab by mouth daily. 90 Tab 0    omeprazole (PRILOSEC) 40 mg capsule Take 1 Cap by mouth daily. 90 Cap 3    albuterol sulfate (PROAIR RESPICLICK) 90 mcg/actuation aepb Take 2 Puffs by inhalation every four (4) hours as needed for Wheezing or Cough.  1 Inhaler 1    busPIRone (BUSPAR) 5 mg tablet Take 1 Tab by mouth three (3) times daily as needed (anxiety). 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 Respiratory: Negative. Cardiovascular: Negative. Neurological: Negative. Psychiatric/Behavioral: Negative for suicidal ideas. The patient is nervous/anxious and has insomnia. I affirm this is a Patient-Initiated Episode with a Patient who has not had a related appointment within my department in the past 7 days or scheduled within the next 24 hours.     Total Time: minutes: 11-20 minutes    Note: not billable if this call serves to triage the patient into an appointment for the relevant concern      Lew Bell NP

## 2020-09-08 DIAGNOSIS — F41.9 ANXIETY: ICD-10-CM

## 2020-09-08 RX ORDER — ESCITALOPRAM OXALATE 10 MG/1
10 TABLET ORAL DAILY
Qty: 30 TAB | Refills: 0 | Status: SHIPPED | OUTPATIENT
Start: 2020-09-08 | End: 2020-09-17 | Stop reason: SDUPTHER

## 2020-09-08 NOTE — TELEPHONE ENCOUNTER
Pharmacy faxed over rx request for patient's escitalopram 10 mg    Last Office visit:  6/9/2020  Last labs:  12/26/2019  Follow up visit:  None on books will give to front to schedule appointment.

## 2020-09-17 ENCOUNTER — VIRTUAL VISIT (OUTPATIENT)
Dept: FAMILY MEDICINE CLINIC | Age: 35
End: 2020-09-17

## 2020-09-17 DIAGNOSIS — F19.982 DRUG-INDUCED INSOMNIA (HCC): Primary | ICD-10-CM

## 2020-09-17 DIAGNOSIS — F41.9 ANXIETY: ICD-10-CM

## 2020-09-17 RX ORDER — BUSPIRONE HYDROCHLORIDE 5 MG/1
5 TABLET ORAL
Qty: 45 TAB | Refills: 0 | Status: SHIPPED | OUTPATIENT
Start: 2020-09-17 | End: 2020-10-27 | Stop reason: DRUGHIGH

## 2020-09-17 RX ORDER — ESCITALOPRAM OXALATE 10 MG/1
10 TABLET ORAL DAILY
Qty: 30 TAB | Refills: 3 | Status: SHIPPED | OUTPATIENT
Start: 2020-09-17 | End: 2020-10-07 | Stop reason: SDUPTHER

## 2020-09-17 NOTE — PROGRESS NOTES
Sterling Singh presents today for   Chief Complaint   Patient presents with    Anxiety     MISAEL  2       Sterling Singh preferred language for health care discussion is english/other. Is someone accompanying this pt? no    Is the patient using any DME equipment during [address]? no    Depression Screening:  3 most recent PHQ Screens 2/7/2020   Little interest or pleasure in doing things Not at all   Feeling down, depressed, irritable, or hopeless Not at all   Total Score PHQ 2 0   Trouble falling or staying asleep, or sleeping too much -   Feeling tired or having little energy -   Poor appetite, weight loss, or overeating -   Feeling bad about yourself - or that you are a failure or have let yourself or your family down -   Trouble concentrating on things such as school, work, reading, or watching TV -   Moving or speaking so slowly that other people could have noticed; or the opposite being so fidgety that others notice -   Thoughts of being better off dead, or hurting yourself in some way -   PHQ 9 Score -   How difficult have these problems made it for you to do your work, take care of your home and get along with others -       Learning Assessment:  Learning Assessment 2/7/2020   PRIMARY LEARNER Patient   HIGHEST LEVEL OF EDUCATION - PRIMARY LEARNER  SOME COLLEGE   BARRIERS PRIMARY LEARNER NONE   CO-LEARNER CAREGIVER No   PRIMARY LANGUAGE ENGLISH   LEARNER PREFERENCE PRIMARY DEMONSTRATION   ANSWERED BY Patient   RELATIONSHIP SELF       Abuse Screening:  Abuse Screening Questionnaire 2/7/2020   Do you ever feel afraid of your partner? N   Are you in a relationship with someone who physically or mentally threatens you? N   Is it safe for you to go home? Y       Generalized Anxiety  MISAEL 2/7 9/17/2020   Feeling nervous, anxious or on edge? 1   Not being able to stop or control worrying?  1   MISAEL-2 Subtotal 2         Health Maintenance Due   Topic Date Due    DTaP/Tdap/Td series (1 - Tdap) 11/19/2008    Flu Vaccine (1) 09/01/2020   . Health Maintenance reviewed and discussed and ordered per Provider. Coordination of Care:  1. Have you been to the ER, urgent care clinic since your last visit? Hospitalized since your last visit? no    2. Have you seen or consulted any other health care providers outside of the [address] [place] [address] since your last visit? Include any pap smears or colon screening.  no      Advance Directive:  Discussed 2/7/20

## 2020-09-17 NOTE — PROGRESS NOTES
Chad Mendez is a 30 y.o. male who was seen by synchronous (real-time) audio-video technology on 9/17/2020 for Anxiety (MISAEL  2)    Reports he has been doing well with the Lexapro but it seems like his anxiety has increased in the last 30 days. He has had a couple of panic attacks in the last 30 days. He has had trouble sleeping since he has been on the Lexapro. He was prescribed Buspar for the insomnia but has not used this. He has had some weight gain. He admits to some fatigue during the day due to his insomnia at night. He was taking the Lexapro at night but had to switch to the day and taking this during the day works better for him. Assessment & Plan:   Diagnoses and all orders for this visit:    1. Drug-induced insomnia ([address]    2. Anxiety  -     escitalopram oxalate (LEXAPRO) 10 mg tablet; Take 1 Tab by mouth daily. -     busPIRone (BUSPAR) 5 mg tablet; Take 1 Tab by mouth three (3) times daily as needed (anxiety). Medication, side effects, possible allergic reactions and warnings reviewed with patient. Patient verbalized understanding. Subjective:       Prior to Admission medications    Medication Sig Start Date End Date Taking? Authorizing Provider   escitalopram oxalate (LEXAPRO) 10 mg tablet Take 1 Tab by mouth daily. 9/17/20  Yes Ree RAMÍREZ NP   busPIRone (BUSPAR) 5 mg tablet Take 1 Tab by mouth three (3) times daily as needed (anxiety). 9/17/20  Yes Ree RAMÍREZ NP   albuterol sulfate (PROAIR RESPICLICK) 90 mcg/actuation aepb Take 2 Puffs by inhalation every four (4) hours as needed for Wheezing or Cough. 12/26/19  Yes Desi Damon NP   escitalopram oxalate (LEXAPRO) 10 mg tablet Take 1 Tab by mouth daily. 9/8/20 9/17/20  Ree RAMÍREZ NP   omeprazole (PRILOSEC) 40 mg capsule Take 1 Cap by mouth daily. 3/6/20   Gloria Wood NP   busPIRone (BUSPAR) 5 mg tablet Take 1 Tab by mouth three (3) times daily as needed (anxiety).  3/6/20 9/17/20  Ether Yolo, Gloria FLANAGAN NP     Patient Active Problem List   Diagnosis Code    Neuritis of left lower extremity G57.92    HNP (herniated nucleus pulposus), lumbar M51.26    Severe obesity ([address] E66.01    Lumbar radiculopathy M54.16    Anxiety F41.9    Elevated blood pressure reading without diagnosis of hypertension R03.0    Gastroesophageal reflux disease without esophagitis K21.9    URI, acute J06.9    Sore throat J02.9    Flu-like symptoms R68.89    Vitamin D deficiency E55.9    Weight gain R63.5    Congenital heart disease Q24.9    Other hyperlipidemia E78.49    Encounter for medication monitoring Z51.81    Elevated blood pressure reading R03.0     Patient Active Problem List    Diagnosis Date Noted    Elevated blood pressure reading 03/06/2020    Other hyperlipidemia 02/07/2020    Encounter for medication monitoring 02/07/2020    Severe obesity ([facility] 12/26/2019    Anxiety 12/26/2019    Elevated blood pressure reading without diagnosis of hypertension 12/26/2019    Gastroesophageal reflux disease without esophagitis 12/26/2019    URI, acute 12/26/2019    Sore throat 12/26/2019    Flu-like symptoms 12/26/2019    Vitamin D deficiency 12/26/2019    Weight gain 12/26/2019    Congenital heart disease 12/26/2019    Lumbar radiculopathy 12/22/2017    Neuritis of left lower extremity 10/12/2016    HNP (herniated nucleus pulposus), lumbar 10/12/2016     Current Outpatient Medications   Medication Sig Dispense Refill    escitalopram oxalate (LEXAPRO) 10 mg tablet Take 1 Tab by mouth daily. 30 Tab 3    busPIRone (BUSPAR) 5 mg tablet Take 1 Tab by mouth three (3) times daily as needed (anxiety). 45 Tab 0    albuterol sulfate (PROAIR RESPICLICK) 90 mcg/actuation aepb Take 2 Puffs by inhalation every four (4) hours as needed for Wheezing or Cough. 1 Inhaler 1    omeprazole (PRILOSEC) 40 mg capsule Take 1 Cap by mouth daily.  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 Negative for weight loss. Cardiovascular: Negative for chest pain. Psychiatric/Behavioral: The patient is nervous/anxious and has insomnia. Objective:   No flowsheet data found.      [INSTRUCTIONS:  \"[x]\" Indicates a positive item  \"[]\" Indicates a negative item  -- DELETE ALL ITEMS NOT EXAMINED]    Constitutional: [x] Appears well-developed and well-nourished [x] No apparent distress      [] Abnormal -     Mental status: [x] Alert and awake  [x] Oriented to person/place/time [x] Able to follow commands    [] Abnormal -     Eyes:   EOM    [x]  Normal    [] Abnormal -   Sclera  [x]  Normal    [] Abnormal -          Discharge [x]  None visible   [] Abnormal -     HENT: [x] Normocephalic, atraumatic  [] Abnormal -   [x] Mouth/Throat: Mucous membranes are moist    External Ears [x] Normal  [] Abnormal -    Neck: [x] No visualized mass [] Abnormal -     Pulmonary/Chest: [x] Respiratory effort normal   [x] No visualized signs of difficulty breathing or respiratory distress        [] Abnormal -      Musculoskeletal:   [x] Normal gait with no signs of ataxia         [x] Normal range of motion of neck        [] Abnormal -     Neurological:        [x] No Facial Asymmetry (Cranial nerve 7 motor function) (limited exam due to video visit)          [x] No gaze palsy        [] Abnormal -          Skin:        [x] No significant exanthematous lesions or discoloration noted on facial skin         [] Abnormal -            Psychiatric:       [x] Normal Affect [] Abnormal -        [x] No Hallucinations    We discussed the expected course, resolution and complications of the diagnosis(es) in detail. Medication risks, benefits, costs, interactions, and alternatives were discussed as indicated. I advised him to contact the office if his condition worsens, changes or fails to improve as anticipated. He expressed understanding with the diagnosis(es) and plan. Quincy Posada, who was evaluated through a patient-initiated, synchronous (real-time) audio-video encounter, and/or his healthcare decision maker, is aware that it is a billable service, with coverage as determined by his insurance carrier. He provided verbal consent to proceed: Yes, and patient identification was verified. It was conducted pursuant to the emergency declaration under the [address] [place] [address], [address] [place] [address] authority and the [employer] General Act. A caregiver was present when appropriate. Ability to conduct physical exam was limited. I was at home. The patient was at home.       Murphy Jameson NP

## 2020-10-07 DIAGNOSIS — F41.9 ANXIETY: ICD-10-CM

## 2020-10-07 RX ORDER — ESCITALOPRAM OXALATE 10 MG/1
10 TABLET ORAL DAILY
Qty: 30 TAB | Refills: 3 | Status: SHIPPED | OUTPATIENT
Start: 2020-10-07 | End: 2020-10-27 | Stop reason: DRUGHIGH

## 2020-10-07 NOTE — TELEPHONE ENCOUNTER
Pt called and stated that he has lost his Lexapro, pt stated that he hasn't had medication in over two days, Pt stated that he has search all around and can not find. Pt stated that he needs another refill. Please advise.

## 2020-11-04 ENCOUNTER — VIRTUAL VISIT (OUTPATIENT)
Dept: FAMILY MEDICINE CLINIC | Age: 35
End: 2020-11-04
Payer: COMMERCIAL

## 2020-11-04 ENCOUNTER — TELEPHONE (OUTPATIENT)
Dept: FAMILY MEDICINE CLINIC | Age: 35
End: 2020-11-04

## 2020-11-04 DIAGNOSIS — U07.1 COVID-19: Primary | ICD-10-CM

## 2020-11-04 DIAGNOSIS — R05.9 COUGH: ICD-10-CM

## 2020-11-04 DIAGNOSIS — R53.83 FATIGUE, UNSPECIFIED TYPE: ICD-10-CM

## 2020-11-04 PROCEDURE — 99214 OFFICE O/P EST MOD 30 MIN: CPT | Performed by: NURSE PRACTITIONER

## 2020-11-04 RX ORDER — BENZONATATE 200 MG/1
200 CAPSULE ORAL
Qty: 21 CAP | Refills: 0 | Status: SHIPPED | OUTPATIENT
Start: 2020-11-04 | End: 2020-11-11

## 2020-11-04 RX ORDER — HYDROCODONE POLISTIREX AND CHLORPHENIRAMINE POLISTIREX 10; 8 MG/5ML; MG/5ML
1 SUSPENSION, EXTENDED RELEASE ORAL
Qty: 115 ML | Refills: 0 | Status: SHIPPED | OUTPATIENT
Start: 2020-11-04 | End: 2020-11-07

## 2020-11-04 NOTE — PROGRESS NOTES
Paolo Dowling is a 30 y.o. male who was seen by synchronous (real-time) audio-video technology on 11/4/2020 for Cough and Other (chest tightness, tested positive for COVID last week)    No headaches or fevers since this past weekend-3 days ago. He is starting to get his taste and smell back. He has been in quarantine. He reports he has a room in the house that he is staying in. He does admit to walking outside to get some fresh air. He denies contact with anyone when he does walk outside [place]. He has a cough that is causing him some chest burning. He reports he does not have any sputum production. He does admit to some sweating. He reports he has been reading online and he would like a Z-John or an antibiotic that would help keep him from getting pneumonia. He reports the cough is so bad that it keeps him awake at night. He reports he has been taking over-the-counter ibuprofen for his fevers, headaches, and fatigue/body aches. Assessment & Plan:   Diagnoses and all orders for this visit:    1. COVID-19  -     DROPLET PLUS; Standing    2. Fatigue, unspecified type    3. Cough  -     benzonatate (TESSALON) 200 mg capsule; Take 1 Cap by mouth three (3) times daily as needed for Cough for up to 7 days.  -     HYDROcodone-chlorpheniramine (TUSSIONEX) 10-8 mg/5 mL suspension; Take 5 mL by mouth every twelve (12) hours as needed for Cough for up to 3 days. Max Daily Amount: 10 mL.  checked with no concerns. Medication, side effects, possible allergic reactions and warnings reviewed with patient. Patient verbalized understanding. I have educated patient on antibiotic stewardship and the difference in a bacterial infection versus a viral infection. I have encouraged him to remain hydrated, walk, and to take deep breaths. He is to continue to quarantine. He is to mask, social distance, and hand  when he is walking outside.   He is aware that this treatment for Covid is symptomatic. OTC tylenol for any headaches, fevers, or body aches. He is to take OTC zinc. He is to go to urgent/emergent care in the event he develops a high fever and have shortness of breath or difficulty breathing. I spent at least 25 minutes on this visit with this established patient. Will follow up with patient in five days. Subjective:       Prior to Admission medications    Medication Sig Start Date End Date Taking? Authorizing Provider   escitalopram oxalate (LEXAPRO) 20 mg tablet Take 1 Tab by mouth daily. 10/27/20  Yes Socorro RAMÍREZ, NP   busPIRone (BUSPAR) 7.5 mg tablet Take 1 Tab by mouth three (3) times daily. 10/27/20  Yes Socorro RAMÍREZ NP   albuterol sulfate (PROAIR RESPICLICK) 90 mcg/actuation aepb Take 2 Puffs by inhalation every four (4) hours as needed for Wheezing or Cough. 12/26/19  Yes Benjie Donahue NP   omeprazole (PRILOSEC) 40 mg capsule Take 1 Cap by mouth daily.  3/6/20   Benjie Donahue NP     Patient Active Problem List   Diagnosis Code    Neuritis of left lower extremity G57.92    HNP (herniated nucleus pulposus), lumbar M51.26    Severe obesity (HCC) E66.01    Lumbar radiculopathy M54.16    Anxiety F41.9    Elevated blood pressure reading without diagnosis of hypertension R03.0    Gastroesophageal reflux disease without esophagitis K21.9    URI, acute J06.9    Sore throat J02.9    Flu-like symptoms R68.89    Vitamin D deficiency E55.9    Weight gain R63.5    Congenital heart disease Q24.9    Other hyperlipidemia E78.49    Encounter for medication monitoring Z51.81    Elevated blood pressure reading R03.0     Patient Active Problem List    Diagnosis Date Noted    Elevated blood pressure reading 03/06/2020    Other hyperlipidemia 02/07/2020    Encounter for medication monitoring 02/07/2020    Severe obesity ([address] 12/26/2019    Anxiety 12/26/2019    Elevated blood pressure reading without diagnosis of hypertension 12/26/2019    Gastroesophageal reflux disease without esophagitis 12/26/2019    URI, acute 12/26/2019    Sore throat 12/26/2019    Flu-like symptoms 12/26/2019    Vitamin D deficiency 12/26/2019    Weight gain 12/26/2019    Congenital heart disease 12/26/2019    Lumbar radiculopathy 12/22/2017    Neuritis of left lower extremity 10/12/2016    HNP (herniated nucleus pulposus), lumbar 10/12/2016     Current Outpatient Medications   Medication Sig Dispense Refill    escitalopram oxalate (LEXAPRO) 20 mg tablet Take 1 Tab by mouth daily. 30 Tab 1    busPIRone (BUSPAR) 7.5 mg tablet Take 1 Tab by mouth three (3) times daily. 90 Tab 0    albuterol sulfate (PROAIR RESPICLICK) 90 mcg/actuation aepb Take 2 Puffs by inhalation every four (4) hours as needed for Wheezing or Cough. 1 Inhaler 1    omeprazole (PRILOSEC) 40 mg capsule Take 1 Cap by mouth daily. 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 Negative for chills and fever (resolved). Respiratory: Positive for cough. Negative for shortness of breath. Gastrointestinal: Positive for diarrhea and nausea. Negative for vomiting. Neurological: Negative for headaches (resolved). Objective:   No flowsheet data found. [INSTRUCTIONS:  \"[x]\" Indicates a positive item  \"[]\" Indicates a negative item  -- DELETE ALL ITEMS NOT EXAMINED]    Constitutional: [x] Appears well-developed and well-nourished [x] No apparent distress      [] Abnormal -     Mental status: [x] Alert and awake  [x] Oriented to person/place/time [x] Able to follow commands    [] Abnormal -     Eyes:   EOM    [x]  Normal    [] Abnormal -   Sclera  [x]  Normal    [] Abnormal -          Discharge [x]  None visible   [] Abnormal -     HENT: [x] Normocephalic, atraumatic  [] Abnormal -   [x] Mouth/Throat: Mucous membranes are moist    External Ears [x] Normal  [] Abnormal -    Neck: [x] No visualized mass [] Abnormal -     Pulmonary/Chest: [x] Respiratory effort normal   [x] No visualized signs of difficulty breathing or respiratory distress        [] Abnormal -      Musculoskeletal:   [x] Normal gait with no signs of ataxia         [x] Normal range of motion of neck        [] Abnormal -     Neurological:        [x] No Facial Asymmetry (Cranial nerve 7 motor function) (limited exam due to video visit)          [x] No gaze palsy        [] Abnormal -          Skin:        [x] No significant exanthematous lesions or discoloration noted on facial skin         [] Abnormal -            Psychiatric:       [x] Normal Affect [] Abnormal -        [x] No Hallucinations    We discussed the expected course, resolution and complications of the diagnosis(es) in detail. Medication risks, benefits, costs, interactions, and alternatives were discussed as indicated. I advised him to contact the office if his condition worsens, changes or fails to improve as anticipated. He expressed understanding with the diagnosis(es) and plan. Jessica Nieves, who was evaluated through a patient-initiated, synchronous (real-time) audio-video encounter, and/or his healthcare decision maker, is aware that it is a billable service, with coverage as determined by his insurance carrier.  He provided verbal consent to proceed: Yes, and patient identification was verified. It was conducted pursuant to the emergency declaration under the [address] [place] [address] and the [employer] General Act. A caregiver was present when appropriate. Ability to conduct physical exam was limited. I was at home. The patient was at home.       Giles Garsia NP

## 2020-11-04 NOTE — TELEPHONE ENCOUNTER
Patient tested positive for COVID last week and he is still having symptoms  such as chest tightness and coughing and he would like some medication.  Please advise

## 2020-11-04 NOTE — PROGRESS NOTES
Venessa Guerra presents today for   Chief Complaint   Patient presents with    Cough    Other     chest tightness, tested positive for COVID last week       Venessa Guerra preferred language for health care discussion is english/other. Is someone accompanying this pt? no    Is the patient using any DME equipment during [address]? no    Depression Screening:  3 most recent PHQ Screens 2/7/2020   Little interest or pleasure in doing things Not at all   Feeling down, depressed, irritable, or hopeless Not at all   Total Score PHQ 2 0   Trouble falling or staying asleep, or sleeping too much -   Feeling tired or having little energy -   Poor appetite, weight loss, or overeating -   Feeling bad about yourself - or that you are a failure or have let yourself or your family down -   Trouble concentrating on things such as school, work, reading, or watching TV -   Moving or speaking so slowly that other people could have noticed; or the opposite being so fidgety that others notice -   Thoughts of being better off dead, or hurting yourself in some way -   PHQ 9 Score -   How difficult have these problems made it for you to do your work, take care of your home and get along with others -       Learning Assessment:  Learning Assessment 2/7/2020   PRIMARY LEARNER Patient   HIGHEST LEVEL OF EDUCATION - PRIMARY LEARNER  SOME COLLEGE   BARRIERS PRIMARY LEARNER NONE   CO-LEARNER CAREGIVER No   PRIMARY LANGUAGE ENGLISH   LEARNER PREFERENCE PRIMARY DEMONSTRATION   ANSWERED BY Patient   RELATIONSHIP SELF       Abuse Screening:  Abuse Screening Questionnaire 2/7/2020   Do you ever feel afraid of your partner? N   Are you in a relationship with someone who physically or mentally threatens you? N   Is it safe for you to go home? Y       Generalized Anxiety  MISAEL 2/7 10/27/2020 9/17/2020   Feeling nervous, anxious or on edge? 1 1   Not being able to stop or control worrying?  1 1   MISAEL-2 Subtotal 2 2         Health Maintenance Due   Topic Date Due    DTaP/Tdap/Td series (1 - Tdap) 11/19/2008    Flu Vaccine (1) 09/01/2020   . Health Maintenance reviewed and discussed and ordered per Provider. Coordination of Care:  1. Have you been to the ER, urgent care clinic since your last visit? Hospitalized since your last visit? no    2. Have you seen or consulted any other health care providers outside of the [address] [place] [address] since your last visit? Include any pap smears or colon screening.  no      Advance Directive:  Discussed 2/7/20

## 2021-02-02 DIAGNOSIS — G47.00 INSOMNIA, UNSPECIFIED TYPE: ICD-10-CM

## 2021-02-02 DIAGNOSIS — F41.9 ANXIETY: ICD-10-CM

## 2021-02-02 RX ORDER — ESCITALOPRAM OXALATE 20 MG/1
20 TABLET ORAL DAILY
Qty: 30 TAB | Refills: 4 | Status: SHIPPED | OUTPATIENT
Start: 2021-02-02 | End: 2021-10-19

## 2021-02-02 NOTE — TELEPHONE ENCOUNTER
Received faxed refill request from [address] [place] [address] visit 11/4/20, no future appt, last labs 1/7/2020. Requested Prescriptions     Pending Prescriptions Disp Refills    escitalopram oxalate (LEXAPRO) 20 mg tablet 30 Tab 1     Sig: Take 1 Tab by mouth daily.

## 2021-12-20 PROBLEM — E78.5 HYPERLIPIDEMIA LDL GOAL <100: Status: ACTIVE | Noted: 2020-02-07

## 2021-12-20 PROBLEM — R73.03 PREDIABETES: Status: ACTIVE | Noted: 2021-12-20

## 2021-12-20 NOTE — PROGRESS NOTES
Gloria Haddad is a 31 y.o. male who was seen by synchronous (real-time) audio-video technology on 12/21/2021 for Anxiety, Diabetes, and Insomnia (not sleeping at all )    Assessment & Plan:     1. Anxiety  Assessment & Plan:  Worsening, increase Lexapro to 20 mg  Not a candidate for hydroxyzine d/t interaction with Lexapro  May consider trazodone if associated insomnia persists  Follow up in 4 weeks  Orders:  -     METABOLIC PANEL, COMPREHENSIVE; Future  -     CBC WITH AUTOMATED DIFF; Future  -     escitalopram oxalate (LEXAPRO) 20 mg tablet; Take 1 Tablet by mouth daily. Indications: repeated episodes of anxiety, Normal, Disp-30 Tablet, R-030 days given. Appointment required for any additional refills. 2. Prediabetes  Assessment & Plan:  Add A1c to set of labs  Orders:  -     METABOLIC PANEL, COMPREHENSIVE; Future  -     HEMOGLOBIN A1C WITH EAG; Future  3. Hyperlipidemia LDL goal <100  Assessment & Plan:  Repeat lipid panel ordered  Orders:  -     LIPID PANEL; Future  4. Psychophysiological insomnia  Assessment & Plan:  Not a candidate for hydroxyzine d/t interaction with Lexapro  May consider Trazodone if persists at follow-up  5. Need for hepatitis C screening test  -     HEPATITIS C AB; Future     Follow-up and Dispositions    · Return in about 4 weeks (around 1/18/2022) for anxiety, insomnia, prediabetes, cholesterol, lab results with PCP       SUBJECTIVE:     HPI    Anxiety-  Patient is seen for anxiety  Associated symptoms:  see MISAEL-7  Patient denies SIHI  Needs refill on Lexapro  Was having frequent panic attacks prior to Lexapro  Reports more stress in his life lately  Has been breaking pill in half as instructed  Has not taken the Buspar  Has been having trouble sleeping but he ends up staying up until midnight and wakes up every 2-3 hours  Key Psychotherapeutic Meds             escitalopram oxalate (LEXAPRO) 20 mg tablet (Taking) Take 1 Tablet by mouth daily.  Indications: repeated episodes of anxiety MISAEL 2/7 12/21/2021 10/27/2020 9/17/2020   Feeling nervous, anxious or on edge? 1 1 1   Not being able to stop or control worrying? 1 1 1   MISAEL-2 Subtotal 2 2 2   Worrying too much about different things? 2 - -   Trouble relaxing? 2 - -   Being so restless that it is hard to sit still? 3 - -   Becoming easily annoyed or irritable? 1 - -   Feeling afraid as if something awful might happen? 0 - -   MISAEL-7 Total Score 10 - -     Prediabetes -  Comorbid:  None   Treatment:  None  Has not had labs in 2 years  Lab Results   Component Value Date/Time    Hemoglobin A1c 5.7 (H) 01/07/2020 10:24 AM     Hyperlipidemia  Treatment:  None  Comorbid:  None   Risk factors:  Prediabetes   Has not had labs in 2 years      Review of Systems   Constitutional: Negative for chills, fever and malaise/fatigue. Respiratory: Negative for shortness of breath. Cardiovascular: Negative for chest pain. Gastrointestinal: Negative for nausea and vomiting. Neurological: Negative for dizziness and headaches. Psychiatric/Behavioral: The patient is nervous/anxious and has insomnia. OBJECTIVE:     Physical Exam   Constitutional: Stable-appearing, in no distress, alert and oriented  HENT:   Head: Normocephalic and atraumatic. Ears:  Hearing grossly intact. Mouth:  No visible perioral lesions, cyanosis, or lip swelling. Pulmonary/Chest: Does not appear dyspneic, no audible wheezes or nasal flaring. Musculoskeletal: Grossly normal active ROM in upper extremities. Neurological:  Intact recent memory, no facial or eyelid drooping, no speech impairment, answering questions appropriately. Psychiatric: Judgment and insight good, normal mood and affect. We discussed the expected course, resolution and complications of the diagnosis(es) in detail. Medication risks, benefits, costs, interactions, and alternatives were discussed as indicated.   I advised him to contact the office if his condition worsens, changes or fails to improve as anticipated. He expressed understanding with the diagnosis(es) and plan. Cedric Holland, who was evaluated through a synchronous (real-time) audio-video encounter, and/or his healthcare decision maker, is aware that it is a billable service, with coverage as determined by his insurance carrier. provided verbal consent to proceed: Yes, and patient identification was verified. It was conducted pursuant to the emergency declaration under the [address] [place] [address] and the [employer] and [employer] General Act. A caregiver was present when appropriate. Ability to conduct physical exam was limited. I was in the office. The patient was at home.     ANGELITA Be

## 2021-12-21 ENCOUNTER — VIRTUAL VISIT (OUTPATIENT)
Dept: FAMILY MEDICINE CLINIC | Age: 36
End: 2021-12-21
Payer: COMMERCIAL

## 2021-12-21 DIAGNOSIS — Z11.59 NEED FOR HEPATITIS C SCREENING TEST: ICD-10-CM

## 2021-12-21 DIAGNOSIS — E78.5 HYPERLIPIDEMIA LDL GOAL <100: ICD-10-CM

## 2021-12-21 DIAGNOSIS — F41.9 ANXIETY: Primary | ICD-10-CM

## 2021-12-21 DIAGNOSIS — R73.03 PREDIABETES: ICD-10-CM

## 2021-12-21 DIAGNOSIS — F51.04 PSYCHOPHYSIOLOGICAL INSOMNIA: ICD-10-CM

## 2021-12-21 PROCEDURE — 99214 OFFICE O/P EST MOD 30 MIN: CPT | Performed by: NURSE PRACTITIONER

## 2021-12-21 RX ORDER — ESCITALOPRAM OXALATE 20 MG/1
20 TABLET ORAL DAILY
Qty: 30 TABLET | Refills: 0 | Status: SHIPPED | OUTPATIENT
Start: 2021-12-21 | End: 2022-07-29 | Stop reason: ALTCHOICE

## 2021-12-21 NOTE — PROGRESS NOTES
Mio Sanchez presents today for   Chief Complaint   Patient presents with    Anxiety    Diabetes    Insomnia     not sleeping at all        Virtual/telephone visit    Depression Screening:  3 most recent PHQ Screens 12/21/2021   Little interest or pleasure in doing things Not at all   Feeling down, depressed, irritable, or hopeless Not at all   Total Score PHQ 2 0   Trouble falling or staying asleep, or sleeping too much -   Feeling tired or having little energy -   Poor appetite, weight loss, or overeating -   Feeling bad about yourself - or that you are a failure or have let yourself or your family down -   Trouble concentrating on things such as school, work, reading, or watching TV -   Moving or speaking so slowly that other people could have noticed; or the opposite being so fidgety that others notice -   Thoughts of being better off dead, or hurting yourself in some way -   PHQ 9 Score -   How difficult have these problems made it for you to do your work, take care of your home and get along with others -       Learning Assessment:  Learning Assessment 2/7/2020   PRIMARY LEARNER Patient   HIGHEST LEVEL OF EDUCATION - PRIMARY LEARNER  SOME COLLEGE   BARRIERS PRIMARY LEARNER NONE   CO-LEARNER CAREGIVER No   PRIMARY LANGUAGE ENGLISH   LEARNER PREFERENCE PRIMARY DEMONSTRATION   ANSWERED BY Patient   RELATIONSHIP SELF       Fall Risk  No flowsheet data found. ADL  No flowsheet data found. Travel Screening:    Travel Screening     No screening recorded since 12/20/21 0000     Travel History   Travel since 11/21/21    No documented travel since 11/21/21         Health Maintenance reviewed and discussed and ordered per Provider. Health Maintenance Due   Topic Date Due    Hepatitis C Screening  Never done    COVID-19 Vaccine (1) Never done    DTaP/Tdap/Td series (1 - Tdap) Never done    A1C test (Diabetic or Prediabetic)  01/07/2021    Flu Vaccine (1) Never done   . Coordination of Care:    1. Have you been to the ER, urgent care clinic since your last visit? Hospitalized since your last visit? no    2. Have you seen or consulted any other health care providers outside of the [address] [place] [address] since your last visit? Include any pap smears or colon screening.  No

## 2021-12-22 ENCOUNTER — TELEPHONE (OUTPATIENT)
Dept: FAMILY MEDICINE CLINIC | Age: 36
End: 2021-12-22

## 2021-12-22 PROBLEM — F51.04 PSYCHOPHYSIOLOGICAL INSOMNIA: Status: ACTIVE | Noted: 2021-12-22

## 2021-12-22 PROBLEM — M54.16 LUMBAR RADICULOPATHY: Status: RESOLVED | Noted: 2017-12-22 | Resolved: 2021-12-22

## 2021-12-22 PROBLEM — R63.5 WEIGHT GAIN: Status: RESOLVED | Noted: 2019-12-26 | Resolved: 2021-12-22

## 2021-12-22 PROBLEM — R68.89 FLU-LIKE SYMPTOMS: Status: RESOLVED | Noted: 2019-12-26 | Resolved: 2021-12-22

## 2021-12-22 PROBLEM — J02.9 SORE THROAT: Status: RESOLVED | Noted: 2019-12-26 | Resolved: 2021-12-22

## 2021-12-22 PROBLEM — J06.9 URI, ACUTE: Status: RESOLVED | Noted: 2019-12-26 | Resolved: 2021-12-22

## 2021-12-22 PROBLEM — R03.0 ELEVATED BLOOD PRESSURE READING: Status: RESOLVED | Noted: 2020-03-06 | Resolved: 2021-12-22

## 2021-12-22 NOTE — TELEPHONE ENCOUNTER
Please call patient to let him know that I was not able to send Hydroxyzine because it interacts with the Lexapro he is taking. We will see if his insomnia improves with the increase of Lexapro for now. Follow-up in 4 weeks with PCP, complete fasting labs 1 week prior to appointment. Please have him call  to schedule appointment. Thank you!

## 2021-12-22 NOTE — ASSESSMENT & PLAN NOTE
Worsening, increase Lexapro to 20 mg  Not a candidate for hydroxyzine d/t interaction with Lexapro  May consider trazodone if associated insomnia persists  Follow up in 4 weeks

## 2021-12-22 NOTE — ASSESSMENT & PLAN NOTE
Not a candidate for hydroxyzine d/t interaction with Lexapro  May consider Trazodone if persists at follow-up

## 2022-02-02 ENCOUNTER — VIRTUAL VISIT (OUTPATIENT)
Dept: FAMILY MEDICINE CLINIC | Age: 37
End: 2022-02-02
Payer: COMMERCIAL

## 2022-02-02 DIAGNOSIS — J01.90 ACUTE NON-RECURRENT SINUSITIS, UNSPECIFIED LOCATION: Primary | ICD-10-CM

## 2022-02-02 DIAGNOSIS — G47.00 INSOMNIA, UNSPECIFIED TYPE: ICD-10-CM

## 2022-02-02 PROCEDURE — 99213 OFFICE O/P EST LOW 20 MIN: CPT | Performed by: FAMILY MEDICINE

## 2022-02-02 RX ORDER — AMOXICILLIN AND CLAVULANATE POTASSIUM 875; 125 MG/1; MG/1
1 TABLET, FILM COATED ORAL 2 TIMES DAILY
Qty: 20 TABLET | Refills: 0 | Status: SHIPPED | OUTPATIENT
Start: 2022-02-02 | End: 2022-02-12

## 2022-02-02 RX ORDER — BISMUTH SUBSALICYLATE 262 MG
1 TABLET,CHEWABLE ORAL DAILY
COMMUNITY

## 2022-02-02 RX ORDER — TRAZODONE HYDROCHLORIDE 50 MG/1
50-100 TABLET ORAL
Qty: 60 TABLET | Refills: 1 | Status: SHIPPED | OUTPATIENT
Start: 2022-02-02 | End: 2022-04-07

## 2022-02-02 NOTE — PROGRESS NOTES
Chief Complaint   Patient presents with    Sinus Infection      patient complains of congestion , migraine, fatigue due to difficulty sleeping at night due to congestion , patient has also had bloody noses frequently  . patient denies cough fever chills body aches . patient has decongestants saline nasal spray ibuprofen   and flonase  . patient ordered humidifier  but hasnt recieved it yet .       No vitals as this is VV

## 2022-02-02 NOTE — PROGRESS NOTES
Corina Humphries is a 31 y.o. male who was seen by synchronous (real-time) audio-video technology on 2/2/2022 for Sinus Infection ( patient complains of congestion , migraine, fatigue due to difficulty sleeping at night due to congestion , patient has also had bloody noses frequently  . patient denies cough fever chills body aches . patient has decongestants saline nasal spray ibuprofen   and flonase  . patient ordered humidifier  but hasnt recieved it yet . )        Assessment & Plan:   Diagnoses and all orders for this visit:    1. Acute non-recurrent sinusitis, unspecified location  -     amoxicillin-clavulanate (AUGMENTIN) 875-125 mg per tablet; Take 1 Tablet by mouth two (2) times a day for 10 days. Recommend symptomatic treatment as needed. Recommend sudafed po q12hr prn. Push fluids. Rest.  Recommend trial of nasal saline. Complete all of the antibiotic. Return for repeat evaluation if sx do not resolve with completion on antibiotic or for worsening sx. 2. Insomnia, unspecified type  -    Trial:  traZODone (DESYREL) 50 mg tablet; Take 1-2 Tablets by mouth nightly. The complexity of medical decision making for this visit is moderate   Follow-up and Dispositions    · Return in about 2 weeks (around 2/16/2022) for insomnia; sooner prn           712  Subjective:   Patient contacted via doxy. me. Pt reports that he had covid sx in Dec but tested neg. He had improved but is having trouble now with his sinuses. He has frequent nose bleeds. Currently, his sinuses feel clogged. He has used flonase, saline spray, otc sinus meds. Nothing is helping much and he is having trouble sleeping. He is having a ha with nausea at this point. Pt has had trouble sleeping for a while. He notes that his anxiety is still high. He falls asleep but does not stay asleep through the night. Prior to Admission medications    Medication Sig Start Date End Date Taking?  Authorizing Provider   multivitamin (ONE A DAY) tablet Take 1 Tablet by mouth daily. Yes Provider, Historical   amoxicillin-clavulanate (AUGMENTIN) 875-125 mg per tablet Take 1 Tablet by mouth two (2) times a day for 10 days. 2/2/22 2/12/22 Yes Michaela Subramanian MD   traZODone (DESYREL) 50 mg tablet Take 1-2 Tablets by mouth nightly. 2/2/22  Yes Michaela Subramanian MD   escitalopram oxalate (LEXAPRO) 20 mg tablet Take 1 Tablet by mouth daily. Indications: repeated episodes of anxiety 12/21/21  Yes Bigg Saunders NP     Patient Active Problem List   Diagnosis Code    HNP (herniated nucleus pulposus), lumbar M51.26    Severe obesity ([facility] [address] E66.01    Anxiety F41.9    Elevated blood pressure reading without diagnosis of hypertension R03.0    Gastroesophageal reflux disease without esophagitis K21.9    Vitamin D deficiency E55.9    Congenital heart disease Q24.9    Hyperlipidemia LDL goal <100 E78.5    Prediabetes R73.03    Psychophysiological insomnia F51.04     Current Outpatient Medications   Medication Sig Dispense Refill    multivitamin (ONE A DAY) tablet Take 1 Tablet by mouth daily.  amoxicillin-clavulanate (AUGMENTIN) 875-125 mg per tablet Take 1 Tablet by mouth two (2) times a day for 10 days. 20 Tablet 0    traZODone (DESYREL) 50 mg tablet Take 1-2 Tablets by mouth nightly. 60 Tablet 1    escitalopram oxalate (LEXAPRO) 20 mg tablet Take 1 Tablet by mouth daily.  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 HENT: Positive for congestion, nosebleeds and sinus pain. Respiratory: Negative. Cardiovascular: Negative. Gastrointestinal: Positive for nausea. Neurological: Positive for headaches. Psychiatric/Behavioral: The patient is nervous/anxious and has insomnia. All other systems reviewed and are negative. Objective:   No flowsheet data found. General: alert, cooperative, no distress   Mental  status: normal mood, behavior, speech, dress, motor activity, and thought processes, able to follow commands   HENT: NCAT   Neck: no visualized mass   Resp: no respiratory distress   Neuro: no gross deficits   Skin: no discoloration or lesions of concern on visible areas   Psychiatric: normal affect, consistent with stated mood, no evidence of hallucinations     Additional exam findings: We discussed the expected course, resolution and complications of the diagnosis(es) in detail. Medication risks, benefits, costs, interactions, and alternatives were discussed as indicated. I advised him to contact the office if his condition worsens, changes or fails to improve as anticipated. He expressed understanding with the diagnosis(es) and plan. Janee Kayser, was evaluated through a synchronous (real-time) audio-video encounter. The patient (or guardian if applicable) is aware that this is a billable service, which includes applicable co-pays. Verbal consent to proceed has been obtained. The visit was conducted pursuant to the emergency declaration under the [address] [place] [address], [address] [place] [address] authority and the [employer] General Act. Patient identification was verified, and a caregiver was present when appropriate.  The patient was located at home in a state where the provider was licensed to provide care.     Jennifer Loco MD

## 2022-02-04 ENCOUNTER — TELEPHONE (OUTPATIENT)
Dept: FAMILY MEDICINE CLINIC | Age: 37
End: 2022-02-04

## 2022-02-04 NOTE — TELEPHONE ENCOUNTER
----- Message from Mariah Merkubia sent at 2/2/2022  1:45 PM EST -----  Subject: Message to Provider    QUESTIONS  Information for Provider? Patient states Dr. Kike Gresham is leaving and wants   to switch over to Dr. Ayana Mann. He saw Dr. Ayana Mann on 2/2 and was told he   needed to followup in 4 to 6 weeks. ---------------------------------------------------------------------------  --------------  Li GARY  What is the best way for the office to contact you? OK to leave message on   voicemail  Preferred Call Back Phone Number? [phone]  ---------------------------------------------------------------------------  --------------  SCRIPT ANSWERS  Relationship to Patient?  Self

## 2022-03-18 PROBLEM — R03.0 ELEVATED BLOOD PRESSURE READING WITHOUT DIAGNOSIS OF HYPERTENSION: Status: ACTIVE | Noted: 2019-12-26

## 2022-03-18 PROBLEM — E66.01 SEVERE OBESITY (HCC): Status: ACTIVE | Noted: 2019-12-26

## 2022-03-18 PROBLEM — E78.5 HYPERLIPIDEMIA LDL GOAL <100: Status: ACTIVE | Noted: 2020-02-07

## 2022-03-18 PROBLEM — F51.04 PSYCHOPHYSIOLOGICAL INSOMNIA: Status: ACTIVE | Noted: 2021-12-22

## 2022-03-19 PROBLEM — F41.9 ANXIETY: Status: ACTIVE | Noted: 2019-12-26

## 2022-03-19 PROBLEM — E55.9 VITAMIN D DEFICIENCY: Status: ACTIVE | Noted: 2019-12-26

## 2022-03-20 PROBLEM — K21.9 GASTROESOPHAGEAL REFLUX DISEASE WITHOUT ESOPHAGITIS: Status: ACTIVE | Noted: 2019-12-26

## 2022-03-20 PROBLEM — Q24.9 CONGENITAL HEART DISEASE: Status: ACTIVE | Noted: 2019-12-26

## 2022-03-20 PROBLEM — R73.03 PREDIABETES: Status: ACTIVE | Noted: 2021-12-20

## 2022-04-05 DIAGNOSIS — G47.00 INSOMNIA, UNSPECIFIED TYPE: ICD-10-CM

## 2022-04-07 RX ORDER — TRAZODONE HYDROCHLORIDE 50 MG/1
TABLET ORAL
Qty: 60 TABLET | Refills: 1 | Status: SHIPPED | OUTPATIENT
Start: 2022-04-07 | End: 2022-07-11

## 2022-07-29 ENCOUNTER — VIRTUAL VISIT (OUTPATIENT)
Dept: FAMILY MEDICINE CLINIC | Age: 37
End: 2022-07-29
Payer: COMMERCIAL

## 2022-07-29 DIAGNOSIS — G47.00 INSOMNIA, UNSPECIFIED TYPE: ICD-10-CM

## 2022-07-29 DIAGNOSIS — F41.9 ANXIETY: Primary | ICD-10-CM

## 2022-07-29 PROCEDURE — 99214 OFFICE O/P EST MOD 30 MIN: CPT | Performed by: FAMILY MEDICINE

## 2022-07-29 RX ORDER — FLUOXETINE HYDROCHLORIDE 20 MG/1
20 CAPSULE ORAL DAILY
Qty: 30 CAPSULE | Refills: 5 | Status: SHIPPED | OUTPATIENT
Start: 2022-07-29 | End: 2022-09-07 | Stop reason: ALTCHOICE

## 2022-07-29 RX ORDER — HYDROXYZINE HYDROCHLORIDE 10 MG/1
10 TABLET, FILM COATED ORAL
Qty: 30 TABLET | Refills: 5 | Status: SHIPPED | OUTPATIENT
Start: 2022-07-29 | End: 2022-09-07 | Stop reason: ALTCHOICE

## 2022-07-29 RX ORDER — ESCITALOPRAM OXALATE 20 MG/1
10 TABLET ORAL DAILY
Qty: 45 TABLET | Refills: 1 | Status: CANCELLED | OUTPATIENT
Start: 2022-07-29

## 2022-07-29 NOTE — PROGRESS NOTES
Clary Givens presents today for   Chief Complaint   Patient presents with    Anxiety     MISAEL  [address] preferred language for health care discussion is english/other. Is someone accompanying this pt? no    Is the patient using any DME equipment during [address]? no    Depression Screening:  3 most recent PHQ Screens 7/29/2022   Little interest or pleasure in doing things Not at all   Feeling down, depressed, irritable, or hopeless Not at all   Total Score PHQ 2 0   Trouble falling or staying asleep, or sleeping too much -   Feeling tired or having little energy -   Poor appetite, weight loss, or overeating -   Feeling bad about yourself - or that you are a failure or have let yourself or your family down -   Trouble concentrating on things such as school, work, reading, or watching TV -   Moving or speaking so slowly that other people could have noticed; or the opposite being so fidgety that others notice -   Thoughts of being better off dead, or hurting yourself in some way -   PHQ 9 Score -   How difficult have these problems made it for you to do your work, take care of your home and get along with others -       Learning Assessment:  Learning Assessment 7/29/2022   PRIMARY LEARNER Patient   HIGHEST LEVEL OF EDUCATION - PRIMARY LEARNER  -   BARRIERS PRIMARY LEARNER -   CO-LEARNER CAREGIVER -   PRIMARY LANGUAGE ENGLISH   LEARNER PREFERENCE PRIMARY DEMONSTRATION   ANSWERED BY patient   RELATIONSHIP SELF       Abuse Screening:  Abuse Screening Questionnaire 7/29/2022   Do you ever feel afraid of your partner? N   Are you in a relationship with someone who physically or mentally threatens you? N   Is it safe for you to go home? Y       Generalized Anxiety  MISAEL 2/7 7/29/2022 12/21/2021 10/27/2020   Feeling nervous, anxious or on edge? - 1 1   Not being able to stop or control worrying? - 1 1   MISAEL-2 Subtotal - 2 2   Worrying too much about different things?  - 2 -   Trouble relaxing? - 2 -   Being so restless that it is hard to sit still? - 3 -   Becoming easily annoyed or irritable? - 1 -   Feeling afraid as if something awful might happen? - 0 -   MISAEL-7 Total Score - 10 -   Feeling nervous, anxious, or on edge 2 - -   Not being able to stop or control worrying 1 - -   Worrying too much about different things 2 - -   Trouble relaxing 1 - -   Being so restless that it is hard to sit still 1 - -   Becoming easily annoyed or irritable 1 - -   Feeling afraid as if something awful might happen 0 - -   MISAEL-7 Total Score 8 - -         Health Maintenance Due   Topic Date Due    Hepatitis C Screening  Never done    COVID-19 Vaccine (1) Never done    DTaP/Tdap/Td series (1 - Tdap) Never done    A1C test (Diabetic or Prediabetic)  01/07/2021   . Health Maintenance reviewed and discussed and ordered per Provider. Coordination of Care:  1. Have you been to the ER, urgent care clinic since your last visit? Hospitalized since your last visit? no    2. Have you seen or consulted any other health care providers outside of the [address] [place] [address] since your last visit? Include any pap smears or colon screening. no      Advance Directive:  1. Do you have an advance directive in place?  Patient Reply:no

## 2022-07-29 NOTE — PROGRESS NOTES
Jamaica Lopez is a 31 y.o. male who was seen by synchronous (real-time) audio-video technology on 2022 for Anxiety (MISAEL  8)        Assessment & Plan:   Diagnoses and all orders for this visit:    1. Anxiety  -     FLUoxetine (PROzac) 20 mg capsule; Take 1 Capsule by mouth in the morning.  -     hydrOXYzine HCL (ATARAX) 10 mg tablet; Take 1 Tablet by mouth two (2) times daily as needed for Anxiety or Sleep. Lengthy discussion about patient's current concerns and treatment options. For now we will discontinue Lexapro. Start Prozac as noted above. Discussed use of hydroxyzine as needed in place of Xanax. Will follow-up in 2 to 4 weeks to assess tolerability of medication regimen and any improvement he may have seen at that time. We discussed titrating up the dose of Prozac versus adding BuSpar depending on how things are going. 2. Insomnia, unspecified type  Patient has not yet tried the trazodone that was prescribed previously. We will hold off on this for now. Patient aware that hydroxyzine can be used as a sleep aid. We will revisit this issue at follow-up. The complexity of medical decision making for this visit is moderate   Follow-up and Dispositions    Return in about 2 weeks (around 2022) for anxiety, medication change(s). I spent at least 35 minutes on this visit with this established patient. 712  Subjective:   Patient contacted via doxy. me. Pt reports that the last 2 months have been difficult. His family had covid although he did not. Also, he lost his grandfather 2 months ago; his dad  just before father's day. Work has been hectic but pt is in the process of applying for a new job. Pt has not been sleeping well and has been having more anxiety than usual.  Pt had some lexapro left-over so has been taking lexapro 10mg without the side effects that were bothersome at 20mg.   He has had wt gain related to it in the past year but acknowledges lifestyle plays a role.  Pt also is now having panic attacks. They had improved in the past when he started lexapro. Pt is not sleeping well. He had seen psych over a year ago. Pt had xanax left-over as well and has had to take it twice. Pt feels unmotivated and has no energy. He is not sure if this is due to the lexapro. Pt started to exercise 2 wks ago and that seems to be helping. He is exercising almost daily; this was a regular habit in the past.  Pt has worked on improving his diet as well. Prior to Admission medications    Medication Sig Start Date End Date Taking? Authorizing Provider   FLUoxetine (PROzac) 20 mg capsule Take 1 Capsule by mouth in the morning. 7/29/22  Yes Delmy Bauer MD   hydrOXYzine HCL (ATARAX) 10 mg tablet Take 1 Tablet by mouth two (2) times daily as needed for Anxiety or Sleep. 7/29/22  Yes Tristan Marks MD   multivitamin (ONE A DAY) tablet Take 1 Tablet by mouth daily. Yes Provider, Historical   traZODone (DESYREL) 50 mg tablet TAKE 1 TO 2 TABLETS BY MOUTH EVERY NIGHT  Patient not taking: Reported on 7/29/2022 7/11/22   Edy Higginbotham NP     Patient Active Problem List   Diagnosis Code    HNP (herniated nucleus pulposus), lumbar M51.26    Severe obesity (HCC) E66.01    Anxiety F41.9    Elevated blood pressure reading without diagnosis of hypertension R03.0    Gastroesophageal reflux disease without esophagitis K21.9    Vitamin D deficiency E55.9    Congenital heart disease Q24.9    Hyperlipidemia LDL goal <100 E78.5    Prediabetes R73.03    Psychophysiological insomnia F51.04     Current Outpatient Medications   Medication Sig Dispense Refill    FLUoxetine (PROzac) 20 mg capsule Take 1 Capsule by mouth in the morning. 30 Capsule 5    hydrOXYzine HCL (ATARAX) 10 mg tablet Take 1 Tablet by mouth two (2) times daily as needed for Anxiety or Sleep. 30 Tablet 5    multivitamin (ONE A DAY) tablet Take 1 Tablet by mouth daily.       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 General: alert, cooperative, no distress   Mental  status: normal mood, behavior, speech, dress, motor activity, and thought processes, able to follow commands   HENT: NCAT   Neck: no visualized mass   Resp: no respiratory distress   Neuro: no gross deficits   Skin: no discoloration or lesions of concern on visible areas   Psychiatric: normal affect, consistent with stated mood, no evidence of hallucinations     Additional exam findings: We discussed the expected course, resolution and complications of the diagnosis(es) in detail. Medication risks, benefits, costs, interactions, and alternatives were discussed as indicated. I advised him to contact the office if his condition worsens, changes or fails to improve as anticipated. He expressed understanding with the diagnosis(es) and plan. Ian Meléndez was evaluated through a synchronous (real-time) audio-video encounter. The patient (or guardian if applicable) is aware that this is a billable service, which includes applicable co-pays. This Virtual Visit was conducted with patient's (and/or legal guardian's) consent.  The visit was conducted pursuant to the emergency declaration under the [address], [facility] waiver authority and the [employer] General Act. Patient identification was verified, and a caregiver was present when appropriate. The patient was located at: Home: [address] [place] [address] [address] [id number]  The provider was located at:  Facility (Appt Department): [address] [place] [address]        Kamla Ledesma MD

## 2022-09-07 ENCOUNTER — VIRTUAL VISIT (OUTPATIENT)
Dept: FAMILY MEDICINE CLINIC | Age: 37
End: 2022-09-07
Payer: COMMERCIAL

## 2022-09-07 DIAGNOSIS — R03.0 ELEVATED BP WITHOUT DIAGNOSIS OF HYPERTENSION: ICD-10-CM

## 2022-09-07 DIAGNOSIS — F41.9 ANXIETY: Primary | ICD-10-CM

## 2022-09-07 DIAGNOSIS — G47.00 INSOMNIA, UNSPECIFIED TYPE: ICD-10-CM

## 2022-09-07 PROCEDURE — 99214 OFFICE O/P EST MOD 30 MIN: CPT | Performed by: FAMILY MEDICINE

## 2022-09-07 RX ORDER — ESCITALOPRAM OXALATE 10 MG/1
10 TABLET ORAL DAILY
Qty: 30 TABLET | Refills: 5 | Status: SHIPPED | OUTPATIENT
Start: 2022-09-07

## 2022-09-07 RX ORDER — BUSPIRONE HYDROCHLORIDE 5 MG/1
5 TABLET ORAL 2 TIMES DAILY
Qty: 60 TABLET | Refills: 5 | Status: SHIPPED | OUTPATIENT
Start: 2022-09-07

## 2022-09-07 NOTE — PROGRESS NOTES
Lali Braden is a 31 y.o. male who was seen by synchronous (real-time) audio-video technology on 9/7/2022 for Anxiety (Patient would like to change his current medication of prozac feels as if it  increases his anxiety  and difficulties sleeping . / )        Assessment & Plan:   Diagnoses and all orders for this visit:    1. Anxiety  -     busPIRone (BUSPAR) 5 mg tablet; Take 1 Tablet by mouth two (2) times a day. -     escitalopram oxalate (LEXAPRO) 10 mg tablet; Take 1 Tablet by mouth daily. D/c prozac. D/c hydroxyzine. 2. Insomnia, unspecified type  Monitor. 3. Elevated BP without diagnosis of hypertension  -     [facility] BP FLOWSHEET    The complexity of medical decision making for this visit is moderate   Follow-up and Dispositions    Return in about 4 weeks (around 10/5/2022) for depression, anxiety, medication change(s), back pain, specialist eval.           Subjective:   Patient contacted via [facility] virtual visit. Pt reports that he would like to change his medication. He has remote hx of back surgery. He has had a flare of his back pain with sciatica and has not been able to sleep. He feels that lack of sleep is contributing to his irritability. Since starting the new meds, he notes hydroxyzine worked well initially. It is not as helpful now. He had been on the new meds for about 4 wks prior to the onset of his back pain. He does not feel there was much improvement in motivation and energy but he did feel that his appetite improved. Pt has had nosebleeds recently. It has even occurred during sleep. He has not checked bp readings lately but thinks they may be high related to pain, lack of sleep, and anxiety. Prior to Admission medications    Medication Sig Start Date End Date Taking? Authorizing Provider   busPIRone (BUSPAR) 5 mg tablet Take 1 Tablet by mouth two (2) times a day.  9/7/22  Yes Angel Kate MD   escitalopram oxalate (LEXAPRO) 10 mg tablet Take 1 Tablet by mouth daily. 9/7/22  Yes Jim Campo MD   multivitamin (ONE A DAY) tablet Take 1 Tablet by mouth daily. Yes Provider, Historical   traZODone (DESYREL) 50 mg tablet TAKE 1 TO 2 TABLETS BY MOUTH EVERY NIGHT  Patient not taking: No sig reported 7/11/22   Liberty Virgen NP     Patient Active Problem List   Diagnosis Code    HNP (herniated nucleus pulposus), lumbar M51.26    Severe obesity ([address] E66.01    Anxiety F41.9    Elevated blood pressure reading without diagnosis of hypertension R03.0    Gastroesophageal reflux disease without esophagitis K21.9    Vitamin D deficiency E55.9    Congenital heart disease Q24.9    Hyperlipidemia LDL goal <100 E78.5    Prediabetes R73.03    Psychophysiological insomnia F51.04     Current Outpatient Medications   Medication Sig Dispense Refill    busPIRone (BUSPAR) 5 mg tablet Take 1 Tablet by mouth two (2) times a day. 60 Tablet 5    escitalopram oxalate (LEXAPRO) 10 mg tablet Take 1 Tablet by mouth daily. 30 Tablet 5    multivitamin (ONE A DAY) tablet Take 1 Tablet by mouth daily. 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     HENT: Negative. Respiratory: Negative. Cardiovascular: Negative. Musculoskeletal:  Positive for back pain. Psychiatric/Behavioral:  The patient is nervous/anxious and has insomnia. All other systems reviewed and are negative. Objective:     Patient-Reported Vitals 9/7/2022   Patient-Reported Weight 255   Patient-Reported Pulse 82   Patient-Reported SpO2 95%      General: alert, cooperative, no distress   Mental  status: normal mood, behavior, speech, dress, motor activity, and thought processes, able to follow commands   HENT: NCAT   Neck: no visualized mass   Resp: no respiratory distress   Neuro: no gross deficits   Skin: no discoloration or lesions of concern on visible areas   Psychiatric: normal affect, consistent with stated mood, no evidence of hallucinations     Additional exam findings: none      We discussed the expected course, resolution and complications of the diagnosis(es) in detail. Medication risks, benefits, costs, interactions, and alternatives were discussed as indicated. I advised him to contact the office if his condition worsens, changes or fails to improve as anticipated. He expressed understanding with the diagnosis(es) and plan. Oscar Hummel, was evaluated through a synchronous (real-time) audio-video encounter. The patient (or guardian if applicable) is aware that this is a billable service, which includes applicable co-pays. This Virtual Visit was conducted with patient's (and/or legal guardian's) consent. The visit was conducted pursuant to the emergency declaration under the [address] [place] [address], [address] [place] [address] [facility] authority and the [employer] and [employer] Act. Patient identification was verified, and a caregiver was present when appropriate. The patient was located at: Home: [address] [place] [address] [address] [phone]  The provider was located at:  Facility (Appt Department): [address] [facility] [facility]        Gerson Coker MD

## 2023-08-07 RX ORDER — ESCITALOPRAM OXALATE 10 MG/1
TABLET ORAL
Qty: 90 TABLET | Refills: 3 | OUTPATIENT
Start: 2023-08-07

## 2023-08-07 NOTE — TELEPHONE ENCOUNTER
Patient called and is asking for a medication refill.  Please advise    escitalopram (LEXAPRO) 10 MG tablet

## 2023-08-08 RX ORDER — ESCITALOPRAM OXALATE 10 MG/1
10 TABLET ORAL DAILY
Qty: 30 TABLET | Refills: 1 | Status: SHIPPED | OUTPATIENT
Start: 2023-08-08

## 2024-04-15 DIAGNOSIS — F41.9 ANXIETY DISORDER, UNSPECIFIED TYPE: Primary | ICD-10-CM

## 2024-04-17 RX ORDER — ESCITALOPRAM OXALATE 10 MG/1
10 TABLET ORAL DAILY
Qty: 30 TABLET | Refills: 1 | OUTPATIENT
Start: 2024-04-17